# Patient Record
Sex: FEMALE | Race: WHITE | NOT HISPANIC OR LATINO | Employment: FULL TIME | URBAN - METROPOLITAN AREA
[De-identification: names, ages, dates, MRNs, and addresses within clinical notes are randomized per-mention and may not be internally consistent; named-entity substitution may affect disease eponyms.]

---

## 2023-06-30 ENCOUNTER — LAB (OUTPATIENT)
Dept: LAB | Facility: LAB | Age: 38
End: 2023-06-30
Payer: COMMERCIAL

## 2023-06-30 ENCOUNTER — OFFICE VISIT (OUTPATIENT)
Dept: PRIMARY CARE | Facility: CLINIC | Age: 38
End: 2023-06-30
Payer: COMMERCIAL

## 2023-06-30 VITALS
HEIGHT: 66 IN | OXYGEN SATURATION: 98 % | TEMPERATURE: 98.6 F | RESPIRATION RATE: 12 BRPM | BODY MASS INDEX: 35.38 KG/M2 | SYSTOLIC BLOOD PRESSURE: 146 MMHG | HEART RATE: 87 BPM | WEIGHT: 220.13 LBS | DIASTOLIC BLOOD PRESSURE: 78 MMHG

## 2023-06-30 DIAGNOSIS — F32.A ANXIETY AND DEPRESSION: ICD-10-CM

## 2023-06-30 DIAGNOSIS — F41.9 ANXIETY AND DEPRESSION: ICD-10-CM

## 2023-06-30 DIAGNOSIS — E55.9 VITAMIN D DEFICIENCY: ICD-10-CM

## 2023-06-30 DIAGNOSIS — Z76.89 ESTABLISHING CARE WITH NEW DOCTOR, ENCOUNTER FOR: ICD-10-CM

## 2023-06-30 DIAGNOSIS — Z00.00 WELLNESS EXAMINATION: Primary | ICD-10-CM

## 2023-06-30 DIAGNOSIS — Z00.00 WELLNESS EXAMINATION: ICD-10-CM

## 2023-06-30 PROBLEM — N80.03 ADENOMYOSIS: Status: ACTIVE | Noted: 2023-04-01

## 2023-06-30 PROBLEM — E78.5 HYPERLIPIDEMIA: Status: ACTIVE | Noted: 2023-06-30

## 2023-06-30 PROBLEM — R22.2 CHEST WALL MASS: Status: ACTIVE | Noted: 2023-06-30

## 2023-06-30 PROBLEM — F98.8 ADULT ATTENTION DEFICIT DISORDER: Status: ACTIVE | Noted: 2022-06-01

## 2023-06-30 PROBLEM — R51.9 CHRONIC DAILY HEADACHE: Status: ACTIVE | Noted: 2023-06-30

## 2023-06-30 LAB
ALANINE AMINOTRANSFERASE (SGPT) (U/L) IN SER/PLAS: 9 U/L (ref 7–45)
ALBUMIN (G/DL) IN SER/PLAS: 3.8 G/DL (ref 3.4–5)
ALKALINE PHOSPHATASE (U/L) IN SER/PLAS: 70 U/L (ref 33–110)
ANION GAP IN SER/PLAS: 15 MMOL/L (ref 10–20)
ASPARTATE AMINOTRANSFERASE (SGOT) (U/L) IN SER/PLAS: 12 U/L (ref 9–39)
BILIRUBIN TOTAL (MG/DL) IN SER/PLAS: 0.4 MG/DL (ref 0–1.2)
CALCIDIOL (25 OH VITAMIN D3) (NG/ML) IN SER/PLAS: 25 NG/ML
CALCIUM (MG/DL) IN SER/PLAS: 8.9 MG/DL (ref 8.6–10.3)
CARBON DIOXIDE, TOTAL (MMOL/L) IN SER/PLAS: 27 MMOL/L (ref 21–32)
CHLORIDE (MMOL/L) IN SER/PLAS: 100 MMOL/L (ref 98–107)
CHOLESTEROL (MG/DL) IN SER/PLAS: 165 MG/DL (ref 0–199)
CHOLESTEROL IN HDL (MG/DL) IN SER/PLAS: 49 MG/DL
CHOLESTEROL/HDL RATIO: 3.4
CREATININE (MG/DL) IN SER/PLAS: 0.9 MG/DL (ref 0.5–1.05)
ERYTHROCYTE DISTRIBUTION WIDTH (RATIO) BY AUTOMATED COUNT: 13.8 % (ref 11.5–14.5)
ERYTHROCYTE MEAN CORPUSCULAR HEMOGLOBIN CONCENTRATION (G/DL) BY AUTOMATED: 31.4 G/DL (ref 32–36)
ERYTHROCYTE MEAN CORPUSCULAR VOLUME (FL) BY AUTOMATED COUNT: 88 FL (ref 80–100)
ERYTHROCYTES (10*6/UL) IN BLOOD BY AUTOMATED COUNT: 4.61 X10E12/L (ref 4–5.2)
GFR FEMALE: 84 ML/MIN/1.73M2
GLUCOSE (MG/DL) IN SER/PLAS: 87 MG/DL (ref 74–99)
HEMATOCRIT (%) IN BLOOD BY AUTOMATED COUNT: 40.7 % (ref 36–46)
HEMOGLOBIN (G/DL) IN BLOOD: 12.8 G/DL (ref 12–16)
LDL: 81 MG/DL (ref 0–99)
LEUKOCYTES (10*3/UL) IN BLOOD BY AUTOMATED COUNT: 8.2 X10E9/L (ref 4.4–11.3)
PLATELETS (10*3/UL) IN BLOOD AUTOMATED COUNT: 428 X10E9/L (ref 150–450)
POTASSIUM (MMOL/L) IN SER/PLAS: 4.7 MMOL/L (ref 3.5–5.3)
PROTEIN TOTAL: 6.9 G/DL (ref 6.4–8.2)
SODIUM (MMOL/L) IN SER/PLAS: 137 MMOL/L (ref 136–145)
THYROTROPIN (MIU/L) IN SER/PLAS BY DETECTION LIMIT <= 0.05 MIU/L: 2.35 MIU/L (ref 0.44–3.98)
TRIGLYCERIDE (MG/DL) IN SER/PLAS: 177 MG/DL (ref 0–149)
UREA NITROGEN (MG/DL) IN SER/PLAS: 11 MG/DL (ref 6–23)
VLDL: 35 MG/DL (ref 0–40)

## 2023-06-30 PROCEDURE — 84443 ASSAY THYROID STIM HORMONE: CPT

## 2023-06-30 PROCEDURE — 82306 VITAMIN D 25 HYDROXY: CPT

## 2023-06-30 PROCEDURE — 1036F TOBACCO NON-USER: CPT

## 2023-06-30 PROCEDURE — 99385 PREV VISIT NEW AGE 18-39: CPT

## 2023-06-30 PROCEDURE — 85027 COMPLETE CBC AUTOMATED: CPT

## 2023-06-30 PROCEDURE — 80053 COMPREHEN METABOLIC PANEL: CPT

## 2023-06-30 PROCEDURE — 80061 LIPID PANEL: CPT

## 2023-06-30 PROCEDURE — 36415 COLL VENOUS BLD VENIPUNCTURE: CPT

## 2023-06-30 RX ORDER — DULOXETIN HYDROCHLORIDE 60 MG/1
60 CAPSULE, DELAYED RELEASE ORAL DAILY
Qty: 90 CAPSULE | Refills: 3 | Status: SHIPPED | OUTPATIENT
Start: 2023-06-30 | End: 2024-06-29

## 2023-06-30 RX ORDER — DULOXETIN HYDROCHLORIDE 60 MG/1
1 CAPSULE, DELAYED RELEASE ORAL DAILY
COMMUNITY
Start: 2022-04-07 | End: 2023-06-30 | Stop reason: SDUPTHER

## 2023-06-30 RX ORDER — NORGESTIMATE AND ETHINYL ESTRADIOL 0.25-0.035
KIT ORAL
COMMUNITY
Start: 2023-04-17

## 2023-06-30 RX ORDER — DEXTROAMPHETAMINE SACCHARATE, AMPHETAMINE ASPARTATE, DEXTROAMPHETAMINE SULFATE AND AMPHETAMINE SULFATE 5; 5; 5; 5 MG/1; MG/1; MG/1; MG/1
1 TABLET ORAL 2 TIMES DAILY
COMMUNITY
Start: 2023-06-14

## 2023-06-30 ASSESSMENT — ENCOUNTER SYMPTOMS
NAUSEA: 0
DYSURIA: 0
SINUS PRESSURE: 0
COMPULSIONS: 0
CONFUSION: 0
WEAKNESS: 0
DEPRESSED MOOD: 0
SLEEP QUALITY: FAIR
HOPELESSNESS: 0
PSYCHOMOTOR AGITATION: 0
CONSTIPATION: 0
PSYCHOMOTOR RETARDATION: 0
RHINORRHEA: 0
TROUBLE SWALLOWING: 0
CHOKING: 0
ABDOMINAL PAIN: 0
MALAISE: 0
SINUS PAIN: 0
NIGHTTIME AWAKENINGS: OCCASIONAL
PANIC: 0
INSOMNIA: 0
BACK PAIN: 0
HYPERSOMNIA: 0
FEVER: 0
SORE THROAT: 0
FEELINGS OF WORTHLESSNESS: 0
WHEEZING: 0
HEMATURIA: 0
PALPITATIONS: 0
COUGH: 0
ARTHRALGIAS: 0
DECREASED CONCENTRATION: 0
HYPERVENTILATION: 0
SEIZURES: 0
FATIGUE: 0
WOUND: 0
HOURS OF SLEEP PER NIGHT: 7 HOURS
ANHEDONIA: 0
JOINT SWELLING: 0
IRRITABILITY: 0
WEIGHT GAIN: 0
FACIAL ASYMMETRY: 0
MEMORY IMPAIRMENT: 0
THOUGHT CONTENT - OBSESSIONS: 0
THOUGHTS THAT DEATH WOULD BE EASIER: 0
SHORTNESS OF BREATH: 0
WEIGHT LOSS: 0
DEPRESSION: 1
NERVOUS/ANXIOUS: 0
RESTLESSNESS: 0
SLEEP DISTURBANCE: 0
HEADACHES: 1
MUSCLE TENSION: 0
UNEXPECTED WEIGHT CHANGE: 0
LIGHT-HEADEDNESS: 0

## 2023-06-30 ASSESSMENT — PATIENT HEALTH QUESTIONNAIRE - PHQ9
1. LITTLE INTEREST OR PLEASURE IN DOING THINGS: NOT AT ALL
SUM OF ALL RESPONSES TO PHQ9 QUESTIONS 1 AND 2: 0
2. FEELING DOWN, DEPRESSED OR HOPELESS: NOT AT ALL

## 2023-06-30 ASSESSMENT — COLUMBIA-SUICIDE SEVERITY RATING SCALE - C-SSRS
6. HAVE YOU EVER DONE ANYTHING, STARTED TO DO ANYTHING, OR PREPARED TO DO ANYTHING TO END YOUR LIFE?: NO
2. HAVE YOU ACTUALLY HAD ANY THOUGHTS OF KILLING YOURSELF?: NO
1. IN THE PAST MONTH, HAVE YOU WISHED YOU WERE DEAD OR WISHED YOU COULD GO TO SLEEP AND NOT WAKE UP?: NO

## 2023-06-30 NOTE — PROGRESS NOTES
Subjective   Patient ID: Verito Cross is a 38 y.o. female who presents for No chief complaint on file..    Pt is here for annual wellness.  Reports overall health is good    Do you take any herbs or supplements that were not prescribed by a doctor? no  Are you taking calcium supplements? no  Are you taking aspirin daily? no  Colonoscopy: n/a  Fasting blood work: 6/30/23  Last eye exam: soon  Last dental Exam: working  Exercise:not really, walks her dog has a mutt  Mood:pleasant  Sleep: good,  snores  Diet:  could be better  Occupation:  works at [employer]  Do you have pain that bothers you in your daily life? no    1. Patient Counseling:  --Nutrition: Stressed importance of moderation in sodium/caffeine intake, saturated fat and cholesterol, caloric balance, sufficient intake of fresh fruits, vegetables, fiber, calcium, iron, and 1 mg of folate supplement per day (for females capable of pregnancy).  --Discussed the issue of estrogen replacement, calcium supplement, and the daily use of baby aspirin.  --Exercise: Stressed the importance of regular exercise.   --Substance Abuse: Discussed cessation/primary prevention of tobacco, alcohol, or other drug use; driving or other dangerous activities under the influence; availability of treatment for abuse.    --Sexuality: Discussed sexually transmitted diseases, partner selection, use of condoms, avoidance of unintended pregnancy  and contraceptive alternatives.   --Injury prevention: Discussed safety belts, safety helmets, smoke detector, smoking near bedding or upholstery.   --Dental health: Discussed importance of regular tooth brushing, flossing, and dental visits.  --Immunizations reviewed.  --Discussed benefits of screening colonoscopy.  --After hours service discussed with patient  2 Discussed the patient's BMI with her.  The BMI is above average. The patient received Provided instructions on dietary changes  Provided instructions on exercise  because they have an above normal BMI.  3 Follow up in one year  Follows with ob for pap       Depression  Visit Type: follow-up  Patient is not experiencing: anhedonia, chest pain, choking sensation, compulsions, confusion, decreased concentration, depressed mood, dizziness, dry mouth, excessive worry, fatigue, feelings of hopelessness, feelings of worthlessness, hypersomnia, hyperventilation, impotence, insomnia, irritability, malaise, memory impairment, muscle tension, nausea, nervousness/anxiety, obsessions, palpitations, panic, psychomotor agitation, psychomotor retardation, restlessness, shortness of breath, suicidal ideas, suicidal planning, thoughts of death, weight gain and weight loss.   Severity: mild (well controlled with med)   Sleep per night: 7 hours  Sleep quality: fair  Nighttime awakenings: occasional  Compliance with medications:  %         Review of Systems   Constitutional:  Negative for fatigue, fever, irritability, unexpected weight change, weight gain and weight loss.   HENT:  Negative for congestion, ear discharge, ear pain, nosebleeds, postnasal drip, rhinorrhea, sinus pressure, sinus pain, sneezing, sore throat and trouble swallowing.    Respiratory:  Negative for cough, choking, shortness of breath and wheezing.    Cardiovascular:  Negative for chest pain, palpitations and leg swelling.   Gastrointestinal:  Negative for abdominal pain, constipation and nausea.   Genitourinary:  Positive for vaginal bleeding. Negative for dysuria, hematuria, impotence, menstrual problem, pelvic pain and vaginal pain.        Recent diagnosis of adenadomosis     Musculoskeletal:  Negative for arthralgias, back pain, gait problem and joint swelling.   Skin:  Negative for rash and wound.   Neurological:  Positive for headaches. Negative for seizures, facial asymmetry, weakness and light-headedness.        Occasional migraine, since she was little, has visual aura before   Psychiatric/Behavioral:   "Positive for depression. Negative for confusion, decreased concentration, sleep disturbance and suicidal ideas. The patient is not nervous/anxious and does not have insomnia.        Objective   /78   Pulse 87   Temp 37 °C (98.6 °F)   Resp 12   Ht 1.676 m (5' 6\")   Wt 99.8 kg (220 lb 2 oz)   SpO2 98%   BMI 35.53 kg/m²     Physical Exam  Constitutional:       Appearance: Normal appearance.   HENT:      Head: Normocephalic and atraumatic.      Right Ear: Tympanic membrane and external ear normal.      Left Ear: Tympanic membrane and external ear normal.      Nose: Nose normal.      Mouth/Throat:      Mouth: Mucous membranes are moist.      Pharynx: Oropharynx is clear. Uvula midline.   Eyes:      General: Lids are normal.      Extraocular Movements: Extraocular movements intact.      Pupils: Pupils are equal, round, and reactive to light.   Neck:      Thyroid: No thyromegaly or thyroid tenderness.   Cardiovascular:      Rate and Rhythm: Normal rate and regular rhythm.      Heart sounds: Normal heart sounds.   Pulmonary:      Effort: Pulmonary effort is normal.      Breath sounds: Normal breath sounds.   Abdominal:      General: Bowel sounds are normal.      Palpations: Abdomen is soft.      Tenderness: There is no abdominal tenderness. There is no guarding.   Musculoskeletal:         General: No swelling. Normal range of motion.      Cervical back: Normal range of motion.      Right lower leg: No edema.      Left lower leg: No edema.   Lymphadenopathy:      Head:      Right side of head: No submental, submandibular or tonsillar adenopathy.      Left side of head: No submental, submandibular or tonsillar adenopathy.      Cervical: No cervical adenopathy.   Skin:     General: Skin is warm and dry.      Capillary Refill: Capillary refill takes less than 2 seconds.      Coloration: Skin is not jaundiced.      Findings: No lesion or rash.   Neurological:      General: No focal deficit present.      Mental " Status: She is alert and oriented to person, place, and time.   Psychiatric:         Mood and Affect: Mood normal.         Behavior: Behavior normal.         Thought Content: Thought content normal.         Judgment: Judgment normal.         Assessment/Plan   Problem List Items Addressed This Visit    None  Visit Diagnoses       Wellness examination    -  Primary    Relevant Orders    TSH with reflex to Free T4 if abnormal    Lipid Panel    Comprehensive Metabolic Panel    CBC    Establishing care with new doctor, encounter for        Vitamin D deficiency        Relevant Orders    Vitamin D, Total

## 2023-11-30 DIAGNOSIS — M25.511 RIGHT SHOULDER PAIN, UNSPECIFIED CHRONICITY: Primary | ICD-10-CM

## 2023-12-01 ENCOUNTER — OFFICE VISIT (OUTPATIENT)
Dept: ORTHOPEDIC SURGERY | Facility: CLINIC | Age: 38
End: 2023-12-01
Payer: COMMERCIAL

## 2023-12-01 ENCOUNTER — HOSPITAL ENCOUNTER (OUTPATIENT)
Dept: RADIOLOGY | Facility: HOSPITAL | Age: 38
Discharge: HOME | End: 2023-12-01
Payer: COMMERCIAL

## 2023-12-01 VITALS — HEIGHT: 66 IN | BODY MASS INDEX: 35.36 KG/M2 | WEIGHT: 220 LBS

## 2023-12-01 DIAGNOSIS — M77.8 TENDINITIS OF RIGHT SHOULDER: Primary | ICD-10-CM

## 2023-12-01 DIAGNOSIS — M25.511 RIGHT SHOULDER PAIN, UNSPECIFIED CHRONICITY: ICD-10-CM

## 2023-12-01 PROCEDURE — 99203 OFFICE O/P NEW LOW 30 MIN: CPT

## 2023-12-01 PROCEDURE — 1036F TOBACCO NON-USER: CPT

## 2023-12-01 PROCEDURE — 73030 X-RAY EXAM OF SHOULDER: CPT | Mod: RIGHT SIDE | Performed by: RADIOLOGY

## 2023-12-01 PROCEDURE — 73030 X-RAY EXAM OF SHOULDER: CPT | Mod: RT,FY

## 2023-12-01 PROCEDURE — 20610 DRAIN/INJ JOINT/BURSA W/O US: CPT

## 2023-12-01 RX ORDER — TRIAMCINOLONE ACETONIDE 40 MG/ML
40 INJECTION, SUSPENSION INTRA-ARTICULAR; INTRAMUSCULAR
Status: COMPLETED | OUTPATIENT
Start: 2023-12-01 | End: 2023-12-01

## 2023-12-01 RX ORDER — LIDOCAINE HYDROCHLORIDE 5 MG/ML
4 INJECTION, SOLUTION INFILTRATION; PERINEURAL
Status: COMPLETED | OUTPATIENT
Start: 2023-12-01 | End: 2023-12-01

## 2023-12-01 RX ADMIN — TRIAMCINOLONE ACETONIDE 40 MG: 40 INJECTION, SUSPENSION INTRA-ARTICULAR; INTRAMUSCULAR at 15:48

## 2023-12-01 RX ADMIN — LIDOCAINE HYDROCHLORIDE 4 ML: 5 INJECTION, SOLUTION INFILTRATION; PERINEURAL at 15:48

## 2023-12-01 NOTE — PROGRESS NOTES
HPI  Verito Cross is a 38 y.o. female  in office today for   Chief Complaint   Patient presents with    Right Shoulder - Pain   .  she states she has had pain for about 6 months, worse in the last few weeks.  Worse with reaching overhead, laying on the shoulder.  She has tried ice, TENS unit, ibuprofen which do help with the pain. Denies any injury or trauma to the shoulder      Medication  Current Outpatient Medications on File Prior to Visit   Medication Sig Dispense Refill    amphetamine-dextroamphetamine (Adderall) 20 mg tablet Take 1 tablet (20 mg) by mouth 2 times a day.      DULoxetine (Cymbalta) 60 mg DR capsule Take 1 capsule (60 mg) by mouth once daily. 90 capsule 3    Estarylla 0.25-35 mg-mcg tablet Take by mouth.       No current facility-administered medications on file prior to visit.       Physical Exam  Constitutional: well developed, well nourished female in no acute distress  Psychiatric: normal mood, appropriate affect  Eyes: sclera anicteric  HENT: normocephalic/atraumatic  CV: regular rate and rhythm   Respiratory: non labored breathing  Integumentary: no rash  Neurological: moves all extremities    Right Shoulder Exam     Tenderness   The patient is experiencing tenderness in the acromioclavicular joint.    Range of Motion   The patient has normal right shoulder ROM.    Muscle Strength   Abduction: 5/5   External rotation: 5/5     Tests   Apprehension: negative  Pete test: negative  Cross arm: negative  Impingement: negative  Drop arm: negative        Patient ID: Verito Cross is a 38 y.o. female.    L Inj/Asp: R subacromial bursa on 12/1/2023 3:48 PM  Indications: pain  Details: 22 G needle, posterior approach  Medications: 40 mg triamcinolone acetonide 40 mg/mL; 4 mL lidocaine 5 mg/mL (0.5 %)  Outcome: tolerated well, no immediate complications  Procedure, treatment alternatives, risks and benefits explained, specific risks discussed. Consent was given by the patient.  Immediately prior to procedure a time out was called to verify the correct patient, procedure, equipment, support staff and site/side marked as required. Patient was prepped and draped in the usual sterile fashion.         Imaging/Lab:  X-rays were taken today which were reviewed by myself and read by myself and show no acute fracture or dislocation.  No degenerative changes.      Assessment  Assessment: Right shoulder pain    Plan  Plan:  History, physical exam, and imaging were reviewed with patient. Pain likely a tendonitis or bursitis, she has good strength and ROM. Discussed options for shoulder pain including RICE, antiinflammatories, injections, PT vs home exercises.  She would like to try an injection at this time.  The right shoulder was injected per procedure note above.  She plans to do some home therapy that her employer offers.   Follow Up: Patient to follow up as needed if pain persists or gets worse.      All questions were answered for the patient prior to end of exam and patient addressed their understanding.    May Krause PA-C  12/01/23

## 2024-06-28 ASSESSMENT — PROMIS GLOBAL HEALTH SCALE
CARRYOUT_PHYSICAL_ACTIVITIES: COMPLETELY
RATE_GENERAL_HEALTH: VERY GOOD
RATE_SOCIAL_SATISFACTION: VERY GOOD
RATE_PHYSICAL_HEALTH: GOOD
CARRYOUT_SOCIAL_ACTIVITIES: VERY GOOD
EMOTIONAL_PROBLEMS: SOMETIMES
RATE_QUALITY_OF_LIFE: VERY GOOD
RATE_AVERAGE_FATIGUE: MILD
RATE_MENTAL_HEALTH: VERY GOOD
RATE_AVERAGE_PAIN: 0

## 2024-07-05 ENCOUNTER — APPOINTMENT (OUTPATIENT)
Dept: PRIMARY CARE | Facility: CLINIC | Age: 39
End: 2024-07-05
Payer: COMMERCIAL

## 2024-07-05 ENCOUNTER — LAB (OUTPATIENT)
Dept: LAB | Facility: LAB | Age: 39
End: 2024-07-05
Payer: COMMERCIAL

## 2024-07-05 VITALS
WEIGHT: 212 LBS | HEIGHT: 66 IN | SYSTOLIC BLOOD PRESSURE: 100 MMHG | HEART RATE: 80 BPM | OXYGEN SATURATION: 98 % | DIASTOLIC BLOOD PRESSURE: 69 MMHG | BODY MASS INDEX: 34.07 KG/M2

## 2024-07-05 DIAGNOSIS — E55.9 VITAMIN D DEFICIENCY: Primary | ICD-10-CM

## 2024-07-05 DIAGNOSIS — Z00.00 WELLNESS EXAMINATION: ICD-10-CM

## 2024-07-05 DIAGNOSIS — E55.9 VITAMIN D DEFICIENCY: ICD-10-CM

## 2024-07-05 LAB
ALBUMIN SERPL BCP-MCNC: 4.2 G/DL (ref 3.4–5)
ALP SERPL-CCNC: 81 U/L (ref 33–110)
ALT SERPL W P-5'-P-CCNC: 16 U/L (ref 7–45)
ANION GAP SERPL CALC-SCNC: 12 MMOL/L (ref 10–20)
AST SERPL W P-5'-P-CCNC: 17 U/L (ref 9–39)
BILIRUB SERPL-MCNC: 0.8 MG/DL (ref 0–1.2)
BUN SERPL-MCNC: 18 MG/DL (ref 6–23)
CALCIUM SERPL-MCNC: 9.3 MG/DL (ref 8.6–10.3)
CHLORIDE SERPL-SCNC: 102 MMOL/L (ref 98–107)
CHOLEST SERPL-MCNC: 194 MG/DL (ref 0–199)
CHOLESTEROL/HDL RATIO: 4
CO2 SERPL-SCNC: 28 MMOL/L (ref 21–32)
CREAT SERPL-MCNC: 1.05 MG/DL (ref 0.5–1.05)
EGFRCR SERPLBLD CKD-EPI 2021: 69 ML/MIN/1.73M*2
ERYTHROCYTE [DISTWIDTH] IN BLOOD BY AUTOMATED COUNT: 13.7 % (ref 11.5–14.5)
GLUCOSE SERPL-MCNC: 89 MG/DL (ref 74–99)
HCT VFR BLD AUTO: 41.6 % (ref 36–46)
HDLC SERPL-MCNC: 48.1 MG/DL
HGB BLD-MCNC: 13.1 G/DL (ref 12–16)
LDLC SERPL CALC-MCNC: 129 MG/DL
MCH RBC QN AUTO: 28.2 PG (ref 26–34)
MCHC RBC AUTO-ENTMCNC: 31.5 G/DL (ref 32–36)
MCV RBC AUTO: 90 FL (ref 80–100)
NON HDL CHOLESTEROL: 146 MG/DL (ref 0–149)
NRBC BLD-RTO: 0 /100 WBCS (ref 0–0)
PLATELET # BLD AUTO: 395 X10*3/UL (ref 150–450)
POTASSIUM SERPL-SCNC: 4.4 MMOL/L (ref 3.5–5.3)
PROT SERPL-MCNC: 7 G/DL (ref 6.4–8.2)
RBC # BLD AUTO: 4.64 X10*6/UL (ref 4–5.2)
SODIUM SERPL-SCNC: 138 MMOL/L (ref 136–145)
TRIGL SERPL-MCNC: 85 MG/DL (ref 0–149)
TSH SERPL-ACNC: 1.61 MIU/L (ref 0.44–3.98)
VLDL: 17 MG/DL (ref 0–40)
WBC # BLD AUTO: 6.2 X10*3/UL (ref 4.4–11.3)

## 2024-07-05 PROCEDURE — 99395 PREV VISIT EST AGE 18-39: CPT

## 2024-07-05 PROCEDURE — 85027 COMPLETE CBC AUTOMATED: CPT

## 2024-07-05 PROCEDURE — 1036F TOBACCO NON-USER: CPT

## 2024-07-05 PROCEDURE — 80053 COMPREHEN METABOLIC PANEL: CPT

## 2024-07-05 PROCEDURE — 82306 VITAMIN D 25 HYDROXY: CPT

## 2024-07-05 PROCEDURE — 84443 ASSAY THYROID STIM HORMONE: CPT

## 2024-07-05 PROCEDURE — 36415 COLL VENOUS BLD VENIPUNCTURE: CPT

## 2024-07-05 PROCEDURE — 80061 LIPID PANEL: CPT

## 2024-07-05 ASSESSMENT — ENCOUNTER SYMPTOMS
SHORTNESS OF BREATH: 0
ARTHRALGIAS: 0
PALPITATIONS: 0
FEVER: 0
ABDOMINAL PAIN: 0
PSYCHIATRIC NEGATIVE: 1
HEADACHES: 1
SINUS PAIN: 0
SINUS PRESSURE: 0
JOINT SWELLING: 0
COUGH: 0
WOUND: 0
TROUBLE SWALLOWING: 0
NAUSEA: 0
FACIAL ASYMMETRY: 0
CONSTIPATION: 0
DYSURIA: 0
RHINORRHEA: 0
LIGHT-HEADEDNESS: 0
UNEXPECTED WEIGHT CHANGE: 0
WHEEZING: 0
SORE THROAT: 0
BACK PAIN: 0
FATIGUE: 0
SEIZURES: 0
WEAKNESS: 0
HEMATURIA: 0

## 2024-07-05 NOTE — PROGRESS NOTES
Subjective   Patient ID: Verito Cross is a 39 y.o. female who presents for Annual Exam (Cpe and annual labs).    Pt is here for annual wellness.  Reports overall health is good     Do you take any herbs or supplements that were not prescribed by a doctor? Vitamin d 5000daily  Are you taking calcium supplements? no  Are you taking aspirin daily? no  Colonoscopy: n/a  Fasting blood work: 7/2024  Last eye exam: dec 2023  Last dental Exam: aug 24  Exercise:not really, walks her dog has a mutt  Mood:pleasant  Sleep: good,  snores-now has cpap  Diet:  could be better working on portion control  Occupation:  works at [employer]  Do you have pain that bothers you in your daily life? no     1. Patient Counseling:  --Nutrition: Stressed importance of moderation in sodium/caffeine intake, saturated fat and cholesterol, caloric balance, sufficient intake of fresh fruits, vegetables, fiber, calcium, iron, and 1 mg of folate supplement per day (for females capable of pregnancy).  --Discussed the issue of estrogen replacement, calcium supplement, and the daily use of baby aspirin.  --Exercise: Stressed the importance of regular exercise.   --Substance Abuse: Discussed cessation/primary prevention of tobacco, alcohol, or other drug use; driving or other dangerous activities under the influence; availability of treatment for abuse.    --Sexuality: Discussed sexually transmitted diseases, partner selection, use of condoms, avoidance of unintended pregnancy  and contraceptive alternatives.   --Injury prevention: Discussed safety belts, safety helmets, smoke detector, smoking near bedding or upholstery.   --Dental health: Discussed importance of regular tooth brushing, flossing, and dental visits.  --Immunizations reviewed.  --Discussed benefits of screening colonoscopy.  --After hours service discussed with patient  2 Discussed the patient's BMI with her.  The BMI is above average. The patient received Provided  "instructions on dietary changes  Provided instructions on exercise because they have an above normal BMI.  3 Follow up in one year  Follows with ob for pap         Review of Systems   Constitutional:  Negative for fatigue, fever and unexpected weight change.   HENT:  Negative for congestion, ear discharge, ear pain, nosebleeds, postnasal drip, rhinorrhea, sinus pressure, sinus pain, sneezing, sore throat and trouble swallowing.    Respiratory:  Negative for cough, shortness of breath and wheezing.    Cardiovascular:  Negative for chest pain, palpitations and leg swelling.   Gastrointestinal:  Negative for abdominal pain, constipation and nausea.   Genitourinary:  Negative for dysuria, hematuria, menstrual problem, pelvic pain, vaginal bleeding and vaginal pain.        Follow with  [facility] wom[facility]     Musculoskeletal:  Negative for arthralgias, back pain, gait problem and joint swelling.   Skin:  Negative for rash and wound.   Neurological:  Positive for headaches. Negative for seizures, facial asymmetry, weakness and light-headedness.   Psychiatric/Behavioral: Negative.         Objective   /69   Pulse 80   Ht 1.676 m (5' 6\")   Wt 96.2 kg (212 lb)   SpO2 98%   BMI 34.22 kg/m²     Physical Exam  Vitals and nursing note reviewed.   Constitutional:       Appearance: Normal appearance.   HENT:      Head: Normocephalic and atraumatic.      Right Ear: Tympanic membrane and external ear normal.      Left Ear: Tympanic membrane and external ear normal.      Nose: Nose normal.      Mouth/Throat:      Mouth: Mucous membranes are moist.      Pharynx: Oropharynx is clear. Uvula midline.   Eyes:      General: Lids are normal.      Extraocular Movements: Extraocular movements intact.      Pupils: Pupils are equal, round, and reactive to light.   Neck:      Thyroid: No thyromegaly or thyroid tenderness.   Cardiovascular:      Rate and Rhythm: Normal rate and regular rhythm.      Heart sounds: Normal heart " sounds.   Pulmonary:      Effort: Pulmonary effort is normal.      Breath sounds: Normal breath sounds.   Abdominal:      General: Bowel sounds are normal.      Palpations: Abdomen is soft.      Tenderness: There is no abdominal tenderness. There is no guarding.   Musculoskeletal:         General: No swelling. Normal range of motion.      Cervical back: Normal range of motion.      Right lower leg: No edema.      Left lower leg: No edema.   Lymphadenopathy:      Head:      Right side of head: No submental, submandibular or tonsillar adenopathy.      Left side of head: No submental, submandibular or tonsillar adenopathy.      Cervical: No cervical adenopathy.   Skin:     General: Skin is warm and dry.      Capillary Refill: Capillary refill takes less than 2 seconds.      Coloration: Skin is not jaundiced.      Findings: No lesion or rash.   Neurological:      General: No focal deficit present.      Mental Status: She is alert and oriented to person, place, and time.   Psychiatric:         Mood and Affect: Mood normal.         Behavior: Behavior normal.         Thought Content: Thought content normal.         Judgment: Judgment normal.         Assessment/Plan   Verito was seen today for annual exam.  Diagnoses and all orders for this visit:  Vitamin D deficiency  -     Comprehensive Metabolic Panel; Future  -     CBC; Future  -     Lipid Panel; Future  -     TSH with reflex to Free T4 if abnormal; Future  Wellness examination  -     Vitamin D 25-Hydroxy,Total (for eval of Vitamin D levels); Future    RTC 1 yr for annual wellness

## 2024-07-06 LAB — 25(OH)D3 SERPL-MCNC: 65 NG/ML (ref 30–100)

## 2024-07-31 ENCOUNTER — APPOINTMENT (OUTPATIENT)
Dept: OBSTETRICS AND GYNECOLOGY | Facility: CLINIC | Age: 39
End: 2024-07-31
Payer: COMMERCIAL

## 2024-08-29 ENCOUNTER — APPOINTMENT (OUTPATIENT)
Dept: OBSTETRICS AND GYNECOLOGY | Facility: CLINIC | Age: 39
End: 2024-08-29
Payer: COMMERCIAL

## 2024-09-10 ENCOUNTER — APPOINTMENT (OUTPATIENT)
Dept: PRIMARY CARE | Facility: CLINIC | Age: 39
End: 2024-09-10
Payer: COMMERCIAL

## 2024-09-19 ENCOUNTER — APPOINTMENT (OUTPATIENT)
Dept: OBSTETRICS AND GYNECOLOGY | Facility: CLINIC | Age: 39
End: 2024-09-19
Payer: COMMERCIAL

## 2024-09-19 VITALS
WEIGHT: 210 LBS | DIASTOLIC BLOOD PRESSURE: 70 MMHG | HEIGHT: 66 IN | BODY MASS INDEX: 33.75 KG/M2 | SYSTOLIC BLOOD PRESSURE: 116 MMHG

## 2024-09-19 DIAGNOSIS — Z01.419 WELL WOMAN EXAM WITH ROUTINE GYNECOLOGICAL EXAM: Primary | ICD-10-CM

## 2024-09-19 DIAGNOSIS — Z12.31 ENCOUNTER FOR SCREENING MAMMOGRAM FOR BREAST CANCER: ICD-10-CM

## 2024-09-19 PROCEDURE — 99395 PREV VISIT EST AGE 18-39: CPT | Performed by: NURSE PRACTITIONER

## 2024-09-19 PROCEDURE — 88175 CYTOPATH C/V AUTO FLUID REDO: CPT

## 2024-09-19 PROCEDURE — 3008F BODY MASS INDEX DOCD: CPT | Performed by: NURSE PRACTITIONER

## 2024-09-19 PROCEDURE — 87624 HPV HI-RISK TYP POOLED RSLT: CPT

## 2024-09-19 PROCEDURE — 1036F TOBACCO NON-USER: CPT | Performed by: NURSE PRACTITIONER

## 2024-09-19 RX ORDER — SAME BUTANEDISULFONATE/BETAINE 400-600 MG
POWDER IN PACKET (EA) ORAL
COMMUNITY

## 2024-09-19 SDOH — ECONOMIC STABILITY: FOOD INSECURITY: WITHIN THE PAST 12 MONTHS, THE FOOD YOU BOUGHT JUST DIDN'T LAST AND YOU DIDN'T HAVE MONEY TO GET MORE.: NEVER TRUE

## 2024-09-19 SDOH — ECONOMIC STABILITY: TRANSPORTATION INSECURITY
IN THE PAST 12 MONTHS, HAS THE LACK OF TRANSPORTATION KEPT YOU FROM MEDICAL APPOINTMENTS OR FROM GETTING MEDICATIONS?: NO

## 2024-09-19 SDOH — ECONOMIC STABILITY: FOOD INSECURITY: WITHIN THE PAST 12 MONTHS, YOU WORRIED THAT YOUR FOOD WOULD RUN OUT BEFORE YOU GOT MONEY TO BUY MORE.: NEVER TRUE

## 2024-09-19 SDOH — ECONOMIC STABILITY: TRANSPORTATION INSECURITY
IN THE PAST 12 MONTHS, HAS LACK OF TRANSPORTATION KEPT YOU FROM MEETINGS, WORK, OR FROM GETTING THINGS NEEDED FOR DAILY LIVING?: NO

## 2024-09-19 ASSESSMENT — ENCOUNTER SYMPTOMS
OCCASIONAL FEELINGS OF UNSTEADINESS: 0
LOSS OF SENSATION IN FEET: 0
DEPRESSION: 0
ABDOMINAL PAIN: 1

## 2024-09-19 ASSESSMENT — PATIENT HEALTH QUESTIONNAIRE - PHQ9
1. LITTLE INTEREST OR PLEASURE IN DOING THINGS: NOT AT ALL
SUM OF ALL RESPONSES TO PHQ9 QUESTIONS 1 & 2: 0
2. FEELING DOWN, DEPRESSED OR HOPELESS: NOT AT ALL

## 2024-09-19 ASSESSMENT — PAIN SCALES - GENERAL: PAINLEVEL: 0-NO PAIN

## 2024-09-19 NOTE — PROGRESS NOTES
"Aarti Hopkins, APRN-CNP     Subjective   Verito Cross is a 39 y.o. female who presents for annual exam.   Patient is here as a 39-year-old G2, P2 for an annual checkup.    Patient would also like to discuss maintenance of her previously diagnosed adenomyosis from an ultrasound.  She continues to have lower abdominal discomfort and we previously discussed placement of a Mirena IUD as possible treatment.  She has had a Mirena in the past so she has knowledge of this device.  Past Medical History:   Diagnosis Date    ADHD (attention deficit hyperactivity disorder) 22    Anxiety 05    Arthritis 2018    Depression 05    Other specified health status     No pertinent past medical history     Past Surgical History:   Procedure Laterality Date     SECTION, CLASSIC  2017     Section     SECTION, LOW TRANSVERSE  08, 13    CHOLECYSTECTOMY  2017    Cholecystectomy       OB History          2    Para   2    Term   2            AB        Living   2         SAB        IAB        Ectopic        Multiple        Live Births   2               Patient's last menstrual period was 2024 (approximate).      Review of Systems   Gastrointestinal:  Positive for abdominal pain.   Genitourinary:  Positive for menstrual problem and pelvic pain.   All other systems reviewed and are negative.    Breast: No Complaints   Vaginal: No Complaints        Objective   /70   Ht 1.676 m (5' 6\")   Wt 95.3 kg (210 lb)   LMP 2024 (Approximate)   BMI 33.89 kg/m²   Physical Exam  Constitutional:       Appearance: She is obese.   Genitourinary:      Vulva and urethral meatus normal.      Right Labia: No rash.     Left Labia: No rash.     No vaginal discharge.      No vaginal prolapse present.     No vaginal atrophy present.       Right Adnexa: not tender and no mass present.     Left Adnexa: not tender and no mass present.     No cervical motion " tenderness.      Uterus is not enlarged.   Breasts:     Right: Normal.      Left: Normal.   HENT:      Head: Normocephalic.   Cardiovascular:      Rate and Rhythm: Normal rate.      Pulses: Normal pulses.   Pulmonary:      Effort: Pulmonary effort is normal.   Abdominal:      Palpations: Abdomen is soft.   Musculoskeletal:      Cervical back: Normal range of motion.   Neurological:      Mental Status: She is alert.   Skin:     General: Skin is warm and dry.   Psychiatric:         Mood and Affect: Mood normal.         Behavior: Behavior normal.                   Assessment/Plan   Problem List Items Addressed This Visit    None  Visit Diagnoses         Codes    Well woman exam with routine gynecological exam    -  Primary Z01.419    Relevant Orders    THINPREP PAP TEST    BI mammo bilateral screening tomosynthesis    Encounter for screening mammogram for breast cancer     Z12.31

## 2024-09-23 ENCOUNTER — APPOINTMENT (OUTPATIENT)
Dept: RADIOLOGY | Facility: HOSPITAL | Age: 39
End: 2024-09-23
Payer: COMMERCIAL

## 2024-09-23 ENCOUNTER — OFFICE VISIT (OUTPATIENT)
Dept: OBSTETRICS AND GYNECOLOGY | Facility: CLINIC | Age: 39
End: 2024-09-23
Payer: COMMERCIAL

## 2024-09-23 VITALS
DIASTOLIC BLOOD PRESSURE: 79 MMHG | SYSTOLIC BLOOD PRESSURE: 118 MMHG | WEIGHT: 209 LBS | BODY MASS INDEX: 33.59 KG/M2 | HEIGHT: 66 IN

## 2024-09-23 DIAGNOSIS — Z30.430 ENCOUNTER FOR IUD INSERTION: ICD-10-CM

## 2024-09-23 DIAGNOSIS — Z30.41 ENCOUNTER FOR SURVEILLANCE OF CONTRACEPTIVE PILLS: Primary | ICD-10-CM

## 2024-09-23 RX ORDER — NORGESTIMATE AND ETHINYL ESTRADIOL 0.25-0.035
1 KIT ORAL DAILY
Qty: 90 TABLET | Refills: 3 | Status: SHIPPED | OUTPATIENT
Start: 2024-09-23 | End: 2025-09-23

## 2024-09-23 ASSESSMENT — ENCOUNTER SYMPTOMS
OCCASIONAL FEELINGS OF UNSTEADINESS: 0
DEPRESSION: 0
LOSS OF SENSATION IN FEET: 0

## 2024-09-23 ASSESSMENT — PAIN SCALES - GENERAL: PAINLEVEL: 0-NO PAIN

## 2024-09-23 NOTE — PROGRESS NOTES
Patient ID: Verito Cross is a 39 y.o. female.    IUD Insertion    Performed by: EMMA Hollis  Authorized by: EMMA Hollis    Procedure: IUD insertion    Pregnancy risk: reasonably certain the patient is not pregnant    Date/Time of Insertion:  2024 1:45 PM  Immediately prior to procedure a time out was called: yes    Pelvic exam performed: yes    Speculum placed in vagina: yes    Cervix cleaned and prepped: yes    Tenaculum/Allis/Ring Forceps applied to cervix: yes    Anesthesia used: no    IUD inserted with no complications: no    Insertion comments: Unable to sound the uterus.  Tenaculum reapplied and unable to pass uterine sound through cervical os.  Patient has had 2  sections although with her first she did dilate to 10 cm and push prior to decision for  section.  Procedure was abandoned and she will return to birth control pills.      Patient will decide if she would like to come back with MD partner to discuss if there are any options to help with discomfort from adenomyosis.

## 2024-09-25 ENCOUNTER — HOSPITAL ENCOUNTER (OUTPATIENT)
Dept: RADIOLOGY | Facility: HOSPITAL | Age: 39
Discharge: HOME | End: 2024-09-25
Payer: COMMERCIAL

## 2024-09-25 VITALS — WEIGHT: 210 LBS | HEIGHT: 66 IN | BODY MASS INDEX: 33.75 KG/M2

## 2024-09-25 DIAGNOSIS — Z01.419 WELL WOMAN EXAM WITH ROUTINE GYNECOLOGICAL EXAM: ICD-10-CM

## 2024-09-25 PROCEDURE — 77063 BREAST TOMOSYNTHESIS BI: CPT | Performed by: RADIOLOGY

## 2024-09-25 PROCEDURE — 77067 SCR MAMMO BI INCL CAD: CPT | Performed by: RADIOLOGY

## 2024-09-25 PROCEDURE — 77067 SCR MAMMO BI INCL CAD: CPT

## 2024-09-30 ENCOUNTER — ANCILLARY ORDERS (OUTPATIENT)
Dept: RADIOLOGY | Facility: CLINIC | Age: 39
End: 2024-09-30
Payer: COMMERCIAL

## 2024-09-30 ENCOUNTER — TELEPHONE (OUTPATIENT)
Dept: OBSTETRICS AND GYNECOLOGY | Facility: CLINIC | Age: 39
End: 2024-09-30
Payer: COMMERCIAL

## 2024-09-30 DIAGNOSIS — R92.8 OTHER ABNORMAL AND INCONCLUSIVE FINDINGS ON DIAGNOSTIC IMAGING OF BREAST: Primary | ICD-10-CM

## 2024-09-30 NOTE — TELEPHONE ENCOUNTER
RN called Patient in response to [employer] message  Patient verified by name and   RN reviewed mammogram results with Patient and provided education  Ultrasound scheduled for 10/1/2024  Patient verbalizes understanding and has no further questions at this time  Yvonne Alvarez RN

## 2024-10-01 ENCOUNTER — HOSPITAL ENCOUNTER (OUTPATIENT)
Dept: RADIOLOGY | Facility: HOSPITAL | Age: 39
Discharge: HOME | End: 2024-10-01
Payer: COMMERCIAL

## 2024-10-01 DIAGNOSIS — R92.8 OTHER ABNORMAL AND INCONCLUSIVE FINDINGS ON DIAGNOSTIC IMAGING OF BREAST: ICD-10-CM

## 2024-10-01 PROCEDURE — 76642 ULTRASOUND BREAST LIMITED: CPT | Mod: RT

## 2024-10-01 PROCEDURE — 76642 ULTRASOUND BREAST LIMITED: CPT | Mod: RIGHT SIDE | Performed by: RADIOLOGY

## 2024-10-01 PROCEDURE — 76982 USE 1ST TARGET LESION: CPT

## 2024-10-21 ENCOUNTER — APPOINTMENT (OUTPATIENT)
Dept: OBSTETRICS AND GYNECOLOGY | Facility: CLINIC | Age: 39
End: 2024-10-21
Payer: COMMERCIAL

## 2024-10-21 ENCOUNTER — PREP FOR PROCEDURE (OUTPATIENT)
Dept: OBSTETRICS AND GYNECOLOGY | Facility: HOSPITAL | Age: 39
End: 2024-10-21

## 2024-10-21 VITALS — SYSTOLIC BLOOD PRESSURE: 117 MMHG | BODY MASS INDEX: 34.38 KG/M2 | WEIGHT: 213 LBS | DIASTOLIC BLOOD PRESSURE: 77 MMHG

## 2024-10-21 DIAGNOSIS — Z01.818 PREOPERATIVE EXAM FOR GYNECOLOGIC SURGERY: ICD-10-CM

## 2024-10-21 DIAGNOSIS — N92.0 MENORRHAGIA WITH REGULAR CYCLE: Primary | ICD-10-CM

## 2024-10-21 DIAGNOSIS — N80.03 ADENOMYOSIS: ICD-10-CM

## 2024-10-21 DIAGNOSIS — N94.6 DYSMENORRHEA: ICD-10-CM

## 2024-10-21 DIAGNOSIS — N92.0 MENORRHAGIA WITH REGULAR CYCLE: ICD-10-CM

## 2024-10-21 DIAGNOSIS — N80.03 ADENOMYOSIS: Primary | ICD-10-CM

## 2024-10-21 DIAGNOSIS — R10.2 PELVIC PAIN IN FEMALE: ICD-10-CM

## 2024-10-21 PROCEDURE — 1036F TOBACCO NON-USER: CPT | Performed by: OBSTETRICS & GYNECOLOGY

## 2024-10-21 PROCEDURE — 99214 OFFICE O/P EST MOD 30 MIN: CPT | Performed by: OBSTETRICS & GYNECOLOGY

## 2024-10-21 RX ORDER — CELECOXIB 200 MG/1
400 CAPSULE ORAL ONCE
OUTPATIENT
Start: 2024-10-21 | End: 2024-10-21

## 2024-10-21 ASSESSMENT — ENCOUNTER SYMPTOMS
HEMATOLOGIC/LYMPHATIC NEGATIVE: 0
ALLERGIC/IMMUNOLOGIC NEGATIVE: 0
PSYCHIATRIC NEGATIVE: 0
EYES NEGATIVE: 0
CARDIOVASCULAR NEGATIVE: 0
RESPIRATORY NEGATIVE: 0
ENDOCRINE NEGATIVE: 0
NEUROLOGICAL NEGATIVE: 0
GASTROINTESTINAL NEGATIVE: 0
CONSTITUTIONAL NEGATIVE: 0
MUSCULOSKELETAL NEGATIVE: 0

## 2024-10-21 ASSESSMENT — PATIENT HEALTH QUESTIONNAIRE - PHQ9
1. LITTLE INTEREST OR PLEASURE IN DOING THINGS: NOT AT ALL
2. FEELING DOWN, DEPRESSED OR HOPELESS: NOT AT ALL
SUM OF ALL RESPONSES TO PHQ9 QUESTIONS 1 AND 2: 0

## 2024-10-21 ASSESSMENT — PAIN SCALES - GENERAL: PAINLEVEL_OUTOF10: 0-NO PAIN

## 2024-10-21 NOTE — PROGRESS NOTES
Subjective   Patient ID: Verito Cross is a 39 y.o. female who presents for Consoltation (Last Pap 9/19/24 wnl. HPV Neg. Mammogram Sched 4/4/25 @7:30am).  HPI  Patient here for evaluation for possible hysterectomy.  Chart extensively reviewed.  Discussed with nurse practitioner as well.  Notes reviewed ultrasound from 1 year ago reviewed as well.  Patient with history of very heavy but regular menses both heavy and light.  Bleeds occasionally through pads.  Patient also notes severe pain during her cycles as well.  Diagnosis adenomyosis.  Attempted IUD placement was not successful.  Had history of a possible IUD placement 1 to 2 years ago which also was not successful.  Currently is on progestin oral contraceptives which has not helped.    Review of Systems  Dysmenorrhea, menorrhagia    Objective   Physical Exam  Vitals reviewed.   Constitutional:       Appearance: Normal appearance. She is obese.   Cardiovascular:      Rate and Rhythm: Normal rate and regular rhythm.   Pulmonary:      Effort: Pulmonary effort is normal.      Breath sounds: Normal breath sounds.   Abdominal:      General: Abdomen is flat. Bowel sounds are normal. There is no distension.      Palpations: Abdomen is soft.      Tenderness: There is no abdominal tenderness. There is no guarding.   Genitourinary:     Comments: External genitalia unremarkable  Vagina clear  Cervix closed very posterior uterus possibly adherent anteriorly but normal size  Adnexa without masses or tenderness  Perineum without lesions or inflammation  Musculoskeletal:      Cervical back: Normal range of motion and neck supple.   Neurological:      General: No focal deficit present.      Mental Status: She is alert.   Psychiatric:         Mood and Affect: Mood normal.         Behavior: Behavior normal.         Thought Content: Thought content normal.         Judgment: Judgment normal.     Assessment/Plan   Diagnoses and all orders for this visit:  Menorrhagia with  regular cycle  Adenomyosis  Dysmenorrhea  Preoperative exam for gynecologic surgery    Chart extensively reviewed.  Case reviewed with nurse practitioner has been taking care of the patient.  Labs and ultrasound noted  Patient with history of menorrhagia with regular cycles severe dysmenorrhea with diagnosis of adenomyosis.  Longstanding.  Patient has been tried on contraceptives in the past apparently is on progestin which is not effective.  Attempted IUD placement x 2 has not been effective either.  Options reviewed with patient and partner.  Extensively gone over.  Discussed hormonal therapy with Depo-Provera or subdermal implant to possibly stop her cycles completely.  May also try IUD to be placed under anesthesia.  Discussed operative management.  Ablation slightly contraindicated due to history of 2 prior  sections.  Increased risk of burn through.  Discussed hysterectomy probably laparoscopic or robotic, possibly would require open case depending on degree of adhesions.  Increased risk of damage to internal organs due to previous surgery noted as well.    At this point patient will consider her options and call back.  Continue oral contraceptives for now  30-minute total visit including consultation with nurse practitioner chart           Keaton Crouch MD 10/21/24 10:32 AM

## 2024-10-22 ENCOUNTER — TELEPHONE (OUTPATIENT)
Dept: OBSTETRICS AND GYNECOLOGY | Facility: CLINIC | Age: 39
End: 2024-10-22
Payer: COMMERCIAL

## 2024-10-22 DIAGNOSIS — Z01.818 PREOPERATIVE EXAM FOR GYNECOLOGIC SURGERY: Primary | ICD-10-CM

## 2024-10-22 PROBLEM — N92.0 MENORRHAGIA WITH REGULAR CYCLE: Status: ACTIVE | Noted: 2024-10-21

## 2024-10-22 PROBLEM — R10.2 PELVIC PAIN IN FEMALE: Status: ACTIVE | Noted: 2024-10-21

## 2024-10-25 ENCOUNTER — PATIENT MESSAGE (OUTPATIENT)
Dept: OBSTETRICS AND GYNECOLOGY | Facility: CLINIC | Age: 39
End: 2024-10-25
Payer: COMMERCIAL

## 2024-10-29 ENCOUNTER — CLINICAL SUPPORT (OUTPATIENT)
Dept: PREADMISSION TESTING | Facility: HOSPITAL | Age: 39
End: 2024-10-29
Payer: COMMERCIAL

## 2024-10-29 DIAGNOSIS — Z01.818 PREOPERATIVE EXAM FOR GYNECOLOGIC SURGERY: ICD-10-CM

## 2024-10-29 RX ORDER — MELATONIN 5 MG
CAPSULE ORAL AS NEEDED
COMMUNITY

## 2024-10-29 RX ORDER — DEXTROMETHORPHAN HYDROBROMIDE, GUAIFENESIN 5; 100 MG/5ML; MG/5ML
650 LIQUID ORAL EVERY 8 HOURS PRN
COMMUNITY

## 2024-11-04 ENCOUNTER — DOCUMENTATION (OUTPATIENT)
Dept: PREADMISSION TESTING | Facility: HOSPITAL | Age: 39
End: 2024-11-04

## 2024-11-04 ENCOUNTER — LAB (OUTPATIENT)
Dept: LAB | Facility: LAB | Age: 39
End: 2024-11-04
Payer: COMMERCIAL

## 2024-11-04 ENCOUNTER — PRE-ADMISSION TESTING (OUTPATIENT)
Dept: PREADMISSION TESTING | Facility: HOSPITAL | Age: 39
End: 2024-11-04
Payer: COMMERCIAL

## 2024-11-04 VITALS
DIASTOLIC BLOOD PRESSURE: 73 MMHG | RESPIRATION RATE: 16 BRPM | TEMPERATURE: 98 F | HEART RATE: 81 BPM | WEIGHT: 209.44 LBS | HEIGHT: 66 IN | OXYGEN SATURATION: 98 % | BODY MASS INDEX: 33.66 KG/M2 | SYSTOLIC BLOOD PRESSURE: 133 MMHG

## 2024-11-04 DIAGNOSIS — N92.0 MENORRHAGIA WITH REGULAR CYCLE: ICD-10-CM

## 2024-11-04 DIAGNOSIS — N80.03 ADENOMYOSIS: ICD-10-CM

## 2024-11-04 DIAGNOSIS — R10.2 PELVIC PAIN IN FEMALE: ICD-10-CM

## 2024-11-04 DIAGNOSIS — Z01.818 PREOP TESTING: Primary | ICD-10-CM

## 2024-11-04 DIAGNOSIS — Z01.818 PREOP TESTING: ICD-10-CM

## 2024-11-04 LAB
ANION GAP SERPL CALC-SCNC: 9 MMOL/L (ref 10–20)
APPEARANCE UR: CLEAR
BACTERIA #/AREA URNS AUTO: ABNORMAL /HPF
BILIRUB UR STRIP.AUTO-MCNC: NEGATIVE MG/DL
BUN SERPL-MCNC: 12 MG/DL (ref 6–23)
CALCIUM SERPL-MCNC: 8.3 MG/DL (ref 8.6–10.3)
CHLORIDE SERPL-SCNC: 104 MMOL/L (ref 98–107)
CO2 SERPL-SCNC: 27 MMOL/L (ref 21–32)
COLOR UR: ABNORMAL
CREAT SERPL-MCNC: 0.82 MG/DL (ref 0.5–1.05)
EGFRCR SERPLBLD CKD-EPI 2021: >90 ML/MIN/1.73M*2
ERYTHROCYTE [DISTWIDTH] IN BLOOD BY AUTOMATED COUNT: 13.7 % (ref 11.5–14.5)
GLUCOSE SERPL-MCNC: 99 MG/DL (ref 74–99)
GLUCOSE UR STRIP.AUTO-MCNC: NORMAL MG/DL
HCT VFR BLD AUTO: 40.8 % (ref 36–46)
HGB BLD-MCNC: 12.8 G/DL (ref 12–16)
HOLD SPECIMEN: NORMAL
KETONES UR STRIP.AUTO-MCNC: NEGATIVE MG/DL
LEUKOCYTE ESTERASE UR QL STRIP.AUTO: NEGATIVE
MCH RBC QN AUTO: 28.3 PG (ref 26–34)
MCHC RBC AUTO-ENTMCNC: 31.4 G/DL (ref 32–36)
MCV RBC AUTO: 90 FL (ref 80–100)
MUCOUS THREADS #/AREA URNS AUTO: ABNORMAL /LPF
NITRITE UR QL STRIP.AUTO: NEGATIVE
NRBC BLD-RTO: 0 /100 WBCS (ref 0–0)
PH UR STRIP.AUTO: 6.5 [PH]
PLATELET # BLD AUTO: 403 X10*3/UL (ref 150–450)
POTASSIUM SERPL-SCNC: 4.5 MMOL/L (ref 3.5–5.3)
PROT UR STRIP.AUTO-MCNC: NEGATIVE MG/DL
RBC # BLD AUTO: 4.52 X10*6/UL (ref 4–5.2)
RBC # UR STRIP.AUTO: ABNORMAL /UL
RBC #/AREA URNS AUTO: ABNORMAL /HPF
SODIUM SERPL-SCNC: 135 MMOL/L (ref 136–145)
SP GR UR STRIP.AUTO: 1.02
SQUAMOUS #/AREA URNS AUTO: ABNORMAL /HPF
UROBILINOGEN UR STRIP.AUTO-MCNC: NORMAL MG/DL
WBC # BLD AUTO: 7.4 X10*3/UL (ref 4.4–11.3)
WBC #/AREA URNS AUTO: ABNORMAL /HPF

## 2024-11-04 PROCEDURE — 80048 BASIC METABOLIC PNL TOTAL CA: CPT

## 2024-11-04 PROCEDURE — 99204 OFFICE O/P NEW MOD 45 MIN: CPT | Performed by: PHYSICIAN ASSISTANT

## 2024-11-04 PROCEDURE — 85027 COMPLETE CBC AUTOMATED: CPT

## 2024-11-04 PROCEDURE — 81001 URINALYSIS AUTO W/SCOPE: CPT

## 2024-11-04 PROCEDURE — 36415 COLL VENOUS BLD VENIPUNCTURE: CPT

## 2024-11-04 ASSESSMENT — ENCOUNTER SYMPTOMS
COUGH: 0
EXCESSIVE BLEEDING: 0
VOMITING: 0
SHORTNESS OF BREATH: 0
DOUBLE VISION: 0
NAUSEA: 0
WEAKNESS: 0
ABDOMINAL DISTENTION: 0
WOUND: 0
ABDOMINAL PAIN: 0
UNEXPECTED WEIGHT CHANGE: 0
VISUAL CHANGE: 0
DIARRHEA: 0
TROUBLE SWALLOWING: 0
BLOOD IN STOOL: 0
DYSPNEA AT REST: 0
LIGHT-HEADEDNESS: 0
DIFFICULTY URINATING: 0
FEVER: 0
EYE DISCHARGE: 0
CONFUSION: 0
HEMOPTYSIS: 0
RHINORRHEA: 0
DYSPNEA WITH EXERTION: 0
MYALGIAS: 0
SINUS CONGESTION: 0
DYSURIA: 0
CONSTIPATION: 0
SKIN CHANGES: 0
NECK STIFFNESS: 0
ARTHRALGIAS: 0
BRUISES/BLEEDS EASILY: 0
NUMBNESS: 0
WHEEZING: 0
LIMITED RANGE OF MOTION: 0
CHILLS: 0
EYE PAIN: 0
PALPITATIONS: 0
NECK PAIN: 0

## 2024-11-04 NOTE — H&P (VIEW-ONLY)
"[facility]/PAT Evaluation       Name: Verito Cross (Verito Cross)  /Age: 1985/39 y.o.       Date of Consult: 24    Referring Provider: Dr. Crouch    Surgery, Date, and Length: Robot Assisted Laparoscopic Total Hysterectomy; Bilateral Salpingectomy; Cystoscopy , 24, 150MIN    Verito Cross is a 39 year-old female who presents to the [facility] for perioperative risk assessment prior to surgery.    Patient presents with a primary diagnosis of menorrhagia and adenomyosis. Pt refers menses have been heavy \"my whole life\".  She refers to severe pelvic since spring of 2023. Pain can be as severe as 10/10 on pain scale, but pain is constant with baseline 4/10 pain.  She denies f/c/n/v.  No gross hematuria.      This note was created in part upon personal review of patient's medical records.      Patient is scheduled to have Robot Assisted Laparoscopic Total Hysterectomy; Bilateral Salpingectomy; Cystoscopy       Pt denies any past history of anesthetic complications such as PONV, awareness, prolonged sedation, dental damage, aspiration, cardiac arrest, difficult intubation, difficult I.V. access or unexpected hospital admissions.  NO malignant hyperthermia and or pseudocholinesterase deficiency.  No history of blood transfusions     The patient is not a Jainism and will accept blood and blood products if medically indicated.   Type and screen NOT sent.     Past Medical History:   Diagnosis Date    ADHD (attention deficit hyperactivity disorder) 22    Anxiety 05    Arthritis 2018    Depression 05    Other specified health status     No pertinent past medical history       Past Surgical History:   Procedure Laterality Date     SECTION, CLASSIC  2017     Section     SECTION, LOW TRANSVERSE  08, 13    CHOLECYSTECTOMY  2017    Cholecystectomy       Patient  reports being sexually active and has had partner(s) " who are male. She reports using the following methods of birth control/protection: Condom Male and OCP.    Family History   Problem Relation Name Age of Onset    Arthritis Mother Genevieve Padgett     Asthma Mother Genevieve Padgett     Depression Mother Genevieve Padgett     Miscarriages / Stillbirths Mother Genevieve Padgett     Arthritis Father Willy Rick     Cancer Father Willy Rick     Depression Father Willy Rick     Alcohol abuse Maternal Grandfather Hal Calero     Diabetes Maternal Grandfather Hal Calero     Blood Disorder Maternal Grandfather Hal Calero     Arthritis Maternal Grandmother Keshia Betts     COPD Maternal Grandmother Keshia Betts     Cancer Mother's Brother Bill Abdias     Cancer Father's Sister Yakelin Marts     Breast cancer Father's Sister Yakelin Marts     Colon cancer Father's Sister Yakelin Marts     Cancer Father's Sister Hanna Matthews     Breast cancer Father's Sister Hanna Matthews     Cancer Father's Brother Ricky Rick     Clotting disorder Sister Jason Padgett     Accidental death Mother's Sister Hazel Iraheta      Social History     Socioeconomic History    Marital status:      Spouse name: Not on file    Number of children: Not on file    Years of education: Not on file    Highest education level: Not on file   Occupational History    Not on file   Tobacco Use    Smoking status: Never    Smokeless tobacco: Never   Vaping Use    Vaping status: Some Days    Substances: THC    Devices: Pre-filled or refillable cartridge   Substance and Sexual Activity    Alcohol use: Yes     Comment: socially    Drug use: Yes     Frequency: 2.0 times per week     Types: Marijuana     Comment: weekly    Sexual activity: Yes     Partners: Male     Birth control/protection: Condom Male, OCP   Other Topics Concern    Not on file   Social History Narrative    Not on file     Social Drivers of Health     Financial Resource Strain: Not on file   Food Insecurity: No Food  Insecurity (9/19/2024)    Hunger Vital Sign     Worried About Running Out of Food in the Last Year: Never true     Ran Out of Food in the Last Year: Never true   Transportation Needs: No Transportation Needs (9/19/2024)    PRAPARE - Transportation     Lack of Transportation (Medical): No     Lack of Transportation (Non-Medical): No   Physical Activity: Not on file   Stress: Not on file   Social Connections: Not on file   Intimate Partner Violence: Not on file   Housing Stability: Not on file        No Known Allergies      Current Outpatient Medications:     acetaminophen (Tylenol 8 HOUR) 650 mg ER tablet, Take 1 tablet (650 mg) by mouth every 8 hours if needed for mild pain (1 - 3). Do not crush, chew, or split., Disp: , Rfl:     amphetamine-dextroamphetamine (Adderall) 20 mg tablet, Take 1 tablet (20 mg) by mouth 2 times a day., Disp: , Rfl:     cholecalciferol, vitD3,/vit K2 (vitamin D3-vitamin K2) 250 mcg (10,000 unit)-45 mcg capsule, Take by mouth., Disp: , Rfl:     DULoxetine (Cymbalta) 60 mg DR capsule, TAKE 1 CAPSULE BY MOUTH ONCE DAILY., Disp: 90 capsule, Rfl: 3    Estarylla 0.25-35 mg-mcg tablet, Take 1 tablet by mouth once daily. Skipping placebo, Disp: 90 tablet, Rfl: 3    ibuprofen/acetaminophen (DUAL ACTION PAIN RELIEVER ORAL), Take by mouth if needed., Disp: , Rfl:     melatonin 5 mg capsule, Take by mouth if needed., Disp: , Rfl:          PAT ROS:   Constitutional:    no fever   no chills   no unexpected weight change  Neuro/Psych:    no numbness   no weakness   no light-headedness   no confusion  Eyes:    no discharge   no pain   no vision loss   no diplopia   no visual disturbance  Ears:    no ear pain   no hearing loss   no tinnitus  Nose:    no nasal discharge   no sinus congestion   no epistaxis  Mouth:    no dental issues   no mouth pain   no oral bleeding   no mouth lesions  Throat:    no throat pain   no dysphagia  Neck:    no neck pain   no neck stiffness  Cardio:    Functional 4 Mets.  Patient denies SOB walking up 2 flights of stairs   Walking 2 dogs regularly; 30 minutes daily; cooking, cleaning, grocery shopping   no chest pain   no palpitations   no peripheral edema   no dyspnea   no HURT  Respiratory:    no cough   no wheezing   no hemoptysis   no shortness of breath  Endocrine:    no cold intolerance   no heat intolerance  GI:    no abdominal distention   no abdominal pain   no constipation   no diarrhea   no nausea   no vomiting   no blood in stool  :    no difficulty urinating   no dysuria   no oliguria  Musculoskeletal:    no arthralgias   no myalgias   no decreased ROM  Hematologic:    does not bruise/bleed easily   no excessive bleeding   no history of blood transfusion   no blood clots  Skin:   no skin changes   no sores/wound   no rash      Physical Exam  Constitutional:       General: She is not in acute distress.     Appearance: Normal appearance. She is not ill-appearing, toxic-appearing or diaphoretic.   HENT:      Head: Normocephalic and atraumatic.      Nose: Nose normal. No congestion or rhinorrhea.      Mouth/Throat:      Mouth: Mucous membranes are moist.      Pharynx: No posterior oropharyngeal erythema.   Eyes:      Extraocular Movements: Extraocular movements intact.      Conjunctiva/sclera: Conjunctivae normal.   Cardiovascular:      Rate and Rhythm: Normal rate and regular rhythm.      Pulses: Normal pulses.      Heart sounds: Normal heart sounds. No murmur heard.     No friction rub. No gallop.   Pulmonary:      Effort: Pulmonary effort is normal. No respiratory distress.      Breath sounds: Normal breath sounds. No stridor. No wheezing, rhonchi or rales.   Abdominal:      General: Bowel sounds are normal. There is no distension.      Palpations: Abdomen is soft. There is no mass.      Tenderness: There is no abdominal tenderness. There is no guarding or rebound.      Hernia: No hernia is present.   Musculoskeletal:         General: No swelling, tenderness, deformity  "or signs of injury. Normal range of motion.      Cervical back: Normal range of motion and neck supple. No rigidity or tenderness.   Skin:     General: Skin is warm and dry.      Coloration: Skin is not jaundiced or pale.      Findings: No bruising, erythema or rash.   Neurological:      General: No focal deficit present.      Mental Status: She is alert and oriented to person, place, and time.      Cranial Nerves: No cranial nerve deficit.      Sensory: No sensory deficit.      Motor: No weakness.      Coordination: Coordination normal.   Psychiatric:         Mood and Affect: Mood normal.         Behavior: Behavior normal.          PAT AIRWAY:   Airway:     Mallampati::  I    Neck ROM::  Full   No broken teeth, no dentures and no missing teeth          Visit Vitals  /73   Pulse 81   Temp 36.7 °C (98 °F)   Resp 16   Ht 1.675 m (5' 5.95\")   Wt 95 kg (209 lb 7 oz)   SpO2 98%   BMI 33.86 kg/m²   OB Status Having periods   Smoking Status Never   BSA 2.1 m²      LABS:  Lab Results   Component Value Date    WBC 7.4 11/04/2024    HGB 12.8 11/04/2024    HCT 40.8 11/04/2024    MCV 90 11/04/2024     11/04/2024      Lab Results   Component Value Date    GLUCOSE 99 11/04/2024    CALCIUM 8.3 (L) 11/04/2024     (L) 11/04/2024    K 4.5 11/04/2024    CO2 27 11/04/2024     11/04/2024    BUN 12 11/04/2024    CREATININE 0.82 11/04/2024      Urinalysis Microscopic  Order: [id number] - Reflex for Order [id number]   Status: Final result       Visible to patient: Yes (not seen)       Dx: Preop testing    0 Result Notes      Component  Ref Range & Units 08:30   WBC, Urine  1-5, NONE /HPF 1-5   RBC, Urine  NONE, 1-2, 3-5 /HPF 1-2   Squamous Epithelial Cells, Urine  Reference range not established. /HPF 1-9 (SPARSE)   Bacteria, Urine  NONE SEEN /HPF 1+ Abnormal    Mucus, Urine  Reference range not established. /LPF FEW   Resulting Agency [facility]             Specimen Collected: 11/04/24 08:30       Assessment and Plan: "     Patient is a 39-year-old female scheduled for a Robot Assisted Laparoscopic Total Hysterectomy; Bilateral Salpingectomy; Cystoscopy  with Dr. Crouch on 24.    Patient has no active cardiac symptoms.   Patient denies any chest pain, tightness, heaviness, pressure, radiating pain, palpitations, irregular heartbeats, lightheadedness, cough, congestion, shortness of breath, HURT, PND, near syncope, weight loss or gain.     RCRI  0  , 3.9 % Risk of MACE    Cardiac:  HLD - borderline - diet controlled.  Not currently taking any lipid lowering meds     Renal:  Hyponatremia  24 Na 138  24 Na 135    Hematology:  Patient instructed to ambulate as soon as possible postoperatively to decrease thromboembolic risk.   Initiate mechanical DVT prophylaxis as soon as possible and initiate chemical prophylaxis when deemed safe from a bleeding standpoint post surgery.     LABS: CBC ,BMP and UA flex ordered. Lab results reviewed and unremarkable with exception of hyponatremia of 135 which is stable.     STOP BAN    Caprini: 4    Risk assessment complete.  Patient is scheduled for a intermediate surgical risk procedure.        Preoperative medication instructions were provided and reviewed with the patient.  Any additional testing or evaluation was explained to the patient.  Nothing by mouth instructions were discussed and patient's questions were answered prior to conclusion to this encounter.  Patient verbalized understanding of preoperative instructions given in preadmission testing; discharge instructions available in EMR.    This note was dictated by a speech recognition.  Minor errors may have been detected in a speech recognition.

## 2024-11-04 NOTE — CPM/PAT NURSE NOTE
CPM/PAT Nurse Note      Name: Verito Cross (Verito Cross)  /Age: 1985/39 y.o.       Past Medical History:   Diagnosis Date    ADHD (attention deficit hyperactivity disorder) 22    Anxiety 05    Arthritis 2018    Depression 05    Other specified health status     No pertinent past medical history       Past Surgical History:   Procedure Laterality Date     SECTION, CLASSIC  2017     Section     SECTION, LOW TRANSVERSE  08, 13    CHOLECYSTECTOMY  2017    Cholecystectomy       Patient  reports being sexually active and has had partner(s) who are male. She reports using the following methods of birth control/protection: Condom Male and OCP.    Family History   Problem Relation Name Age of Onset    Arthritis Mother Genevieve Timoneri     Asthma Mother Genevieve Timoneri     Depression Mother Genevieve Timoneri     Miscarriages / Stillbirths Mother Genevieve Timoneri     Arthritis Father Willy Rick     Cancer Father Willy Rick     Depression Father Willy Rick     Alcohol abuse Maternal Grandfather Hal Calero     Diabetes Maternal Grandfather Hal Calero     Blood Disorder Maternal Grandfather Hal Calero     Arthritis Maternal Grandmother Keshia Roxane     COPD Maternal Grandmother Keshia Roxane     Cancer Mother's Brother Jacob Calero     Cancer Father's Sister Yakelin Marts     Breast cancer Father's Sister Yakelin Marts     Colon cancer Father's Sister Yakelin Marts     Cancer Father's Sister Hanna Matthews     Breast cancer Father's Sister Hanna Matthews     Cancer Father's Brother Ricky Rick     Clotting disorder Sister Jason Padgett     Accidental death Mother's Sister Hazel Iraheta        No Known Allergies    Prior to Admission medications    Medication Sig Start Date End Date Taking? Authorizing Provider   acetaminophen (Tylenol 8 HOUR) 650 mg ER tablet Take 1 tablet (650 mg) by mouth every 8 hours if needed  for mild pain (1 - 3). Do not crush, chew, or split.    Historical Provider, MD   amphetamine-dextroamphetamine (Adderall) 20 mg tablet Take 1 tablet (20 mg) by mouth 2 times a day. 6/14/23   Historical Provider, MD   cholecalciferol, vitD3,/vit K2 (vitamin D3-vitamin K2) 250 mcg (10,000 unit)-45 mcg capsule Take by mouth.    Historical Provider, MD   DULoxetine (Cymbalta) 60 mg DR capsule TAKE 1 CAPSULE BY MOUTH ONCE DAILY. 6/26/24   EMMA Dunn   Estarylla 0.25-35 mg-mcg tablet Take 1 tablet by mouth once daily. Skipping placebo 9/23/24 9/23/25  EMMA Hollis   ibuprofen/acetaminophen (DUAL ACTION PAIN RELIEVER ORAL) Take by mouth if needed.    Historical Provider, MD   melatonin 5 mg capsule Take by mouth if needed.    Historical Provider, MD CONRADO REA     DASI Risk Score      Flowsheet Row Questionnaire Series Submission from 10/24/2024 in [facility] with Generic Provider Astrid   Can you take care of yourself (eat, dress, bathe, or use toilet)?  2.75  filed at 10/24/2024 1058   Can you walk indoors, such as around your house? 1.75  filed at 10/24/2024 1058   Can you walk a block or two on level ground?  2.75  filed at 10/24/2024 1058   Can you climb a flight of stairs or walk up a hill? 5.5  filed at 10/24/2024 1058   Can you run a short distance? 8  filed at 10/24/2024 1058   Can you do light work around the house like dusting or washing dishes? 2.7  filed at 10/24/2024 1058   Can you do moderate work around the house like vacuuming, sweeping floors or carrying groceries? 3.5  filed at 10/24/2024 1058   Can you do heavy work around the house like scrubbing floors or lifting and moving heavy furniture?  8  filed at 10/24/2024 1058   Can you do yard work like raking leaves, weeding or pushing a mower? 4.5  filed at 10/24/2024 1058   Can you have sexual relations? 5.25  filed at 10/24/2024 1058   Can you participate in moderate recreational activities like golf, bowling, dancing,  doubles tennis or throwing a baseball or football? 6  filed at 10/24/2024 1058   Can you participate in strenous sports like swimming, singles tennis, football, basketball, or skiing? 7.5  filed at 10/24/2024 1058   DASI SCORE 58.2  filed at 10/24/2024 1058   METS Score (Will be calculated only when all the questions are answered) 9.9  filed at 10/24/2024 1058          Caprini DVT Assessment    No data to display       Modified Frailty Index    No data to display       CHADS2 Stroke Risk  Current as of 7 minutes ago        N/A 3 to 100%: High Risk   2 to < 3%: Medium Risk   0 to < 2%: Low Risk     Last Change: N/A          This score determines the patient's risk of having a stroke if the patient has atrial fibrillation.        This score is not applicable to this patient. Components are not calculated.          Revised Cardiac Risk Index    No data to display       Apfel Simplified Score    No data to display       Risk Analysis Index Results This Encounter    No data found in the last 10 encounters.       Stop Bang Score      Flowsheet Row Questionnaire Series Submission from 10/24/2024 in [facility] with Generic Provider Astrid   Do you snore loudly? 0  filed at 10/24/2024 1058   Do you often feel tired or fatigued after your sleep? 0  filed at 10/24/2024 1058   Has anyone ever observed you stop breathing in your sleep? 0  filed at 10/24/2024 1058   Do you have or are you being treated for high blood pressure? 0  filed at 10/24/2024 1058   Recent BMI (Calculated) 33.9  filed at 10/24/2024 1058   Is BMI greater than 35 kg/m2? 0=No  filed at 10/24/2024 1058   Age older than 50 years old? 0=No  filed at 10/24/2024 1058   Gender - Male 0=No  filed at 10/24/2024 1058          Prodigy: High Risk  Total Score: 0          ARISCAT Score for Postoperative Pulmonary Complications    No data to display       [place] Perioperative Risk for Myocardial Infarction or Cardiac Arrest (JERAD)    No data to display       .gen  Nurse  Plan of Action:

## 2024-11-04 NOTE — CPM/PAT H&P
"[facility]/PAT Evaluation       Name: Verito Cross (Verito Cross)  /Age: 1985/39 y.o.       Date of Consult: 24    Referring Provider: Dr. Crouch    Surgery, Date, and Length: Robot Assisted Laparoscopic Total Hysterectomy; Bilateral Salpingectomy; Cystoscopy , 24, 150MIN    Verito Cross is a 39 year-old female who presents to the [facility] for perioperative risk assessment prior to surgery.    Patient presents with a primary diagnosis of menorrhagia and adenomyosis. Pt refers menses have been heavy \"my whole life\".  She refers to severe pelvic since spring of 2023. Pain can be as severe as 10/10 on pain scale, but pain is constant with baseline 4/10 pain.  She denies f/c/n/v.  No gross hematuria.      This note was created in part upon personal review of patient's medical records.      Patient is scheduled to have Robot Assisted Laparoscopic Total Hysterectomy; Bilateral Salpingectomy; Cystoscopy       Pt denies any past history of anesthetic complications such as PONV, awareness, prolonged sedation, dental damage, aspiration, cardiac arrest, difficult intubation, difficult I.V. access or unexpected hospital admissions.  NO malignant hyperthermia and or pseudocholinesterase deficiency.  No history of blood transfusions     The patient is not a [facility] and will accept blood and blood products if medically indicated.   Type and screen NOT sent.     Past Medical History:   Diagnosis Date    ADHD (attention deficit hyperactivity disorder) 22    Anxiety 05    Arthritis 2018    Depression 05    Other specified health status     No pertinent past medical history       Past Surgical History:   Procedure Laterality Date     SECTION, CLASSIC  2017     Section     SECTION, LOW TRANSVERSE  08, 13    CHOLECYSTECTOMY  2017    Cholecystectomy       Patient  reports being sexually active and has had partner(s) " who are male. She reports using the following methods of birth control/protection: Condom Male and OCP.    Family History   Problem Relation Name Age of Onset    Arthritis Mother Genevieve Padgett     Asthma Mother Genevieve Padgett     Depression Mother Genevieve Padgett     Miscarriages / Stillbirths Mother Genevieve Padgett     Arthritis Father Willy Rick     Cancer Father Willy Rick     Depression Father Willy Rick     Alcohol abuse Maternal Grandfather Hal Calero     Diabetes Maternal Grandfather Hal Calero     Blood Disorder Maternal Grandfather Hal Calero     Arthritis Maternal Grandmother Keshia Betts     COPD Maternal Grandmother Keshia Betts     Cancer Mother's Brother Bill Abdias     Cancer Father's Sister Yakelin Marts     Breast cancer Father's Sister Yakelin Marts     Colon cancer Father's Sister Yakelin Marts     Cancer Father's Sister Hanna Matthews     Breast cancer Father's Sister Hanna Matthews     Cancer Father's Brother Ricky Rick     Clotting disorder Sister Jason Padgett     Accidental death Mother's Sister Hazel Iraheta      Social History     Socioeconomic History    Marital status:      Spouse name: Not on file    Number of children: Not on file    Years of education: Not on file    Highest education level: Not on file   Occupational History    Not on file   Tobacco Use    Smoking status: Never    Smokeless tobacco: Never   Vaping Use    Vaping status: Some Days    Substances: THC    Devices: Pre-filled or refillable cartridge   Substance and Sexual Activity    Alcohol use: Yes     Comment: socially    Drug use: Yes     Frequency: 2.0 times per week     Types: Marijuana     Comment: weekly    Sexual activity: Yes     Partners: Male     Birth control/protection: Condom Male, OCP   Other Topics Concern    Not on file   Social History Narrative    Not on file     Social Drivers of Health     Financial Resource Strain: Not on file   Food Insecurity: No Food  Insecurity (9/19/2024)    Hunger Vital Sign     Worried About Running Out of Food in the Last Year: Never true     Ran Out of Food in the Last Year: Never true   Transportation Needs: No Transportation Needs (9/19/2024)    PRAPARE - Transportation     Lack of Transportation (Medical): No     Lack of Transportation (Non-Medical): No   Physical Activity: Not on file   Stress: Not on file   Social Connections: Not on file   Intimate Partner Violence: Not on file   Housing Stability: Not on file        No Known Allergies      Current Outpatient Medications:     acetaminophen (Tylenol 8 HOUR) 650 mg ER tablet, Take 1 tablet (650 mg) by mouth every 8 hours if needed for mild pain (1 - 3). Do not crush, chew, or split., Disp: , Rfl:     amphetamine-dextroamphetamine (Adderall) 20 mg tablet, Take 1 tablet (20 mg) by mouth 2 times a day., Disp: , Rfl:     cholecalciferol, vitD3,/vit K2 (vitamin D3-vitamin K2) 250 mcg (10,000 unit)-45 mcg capsule, Take by mouth., Disp: , Rfl:     DULoxetine (Cymbalta) 60 mg DR capsule, TAKE 1 CAPSULE BY MOUTH ONCE DAILY., Disp: 90 capsule, Rfl: 3    Estarylla 0.25-35 mg-mcg tablet, Take 1 tablet by mouth once daily. Skipping placebo, Disp: 90 tablet, Rfl: 3    ibuprofen/acetaminophen (DUAL ACTION PAIN RELIEVER ORAL), Take by mouth if needed., Disp: , Rfl:     melatonin 5 mg capsule, Take by mouth if needed., Disp: , Rfl:          PAT ROS:   Constitutional:    no fever   no chills   no unexpected weight change  Neuro/Psych:    no numbness   no weakness   no light-headedness   no confusion  Eyes:    no discharge   no pain   no vision loss   no diplopia   no visual disturbance  Ears:    no ear pain   no hearing loss   no tinnitus  Nose:    no nasal discharge   no sinus congestion   no epistaxis  Mouth:    no dental issues   no mouth pain   no oral bleeding   no mouth lesions  Throat:    no throat pain   no dysphagia  Neck:    no neck pain   no neck stiffness  Cardio:    Functional 4 Mets.  Patient denies SOB walking up 2 flights of stairs   Walking 2 dogs regularly; 30 minutes daily; cooking, cleaning, grocery shopping   no chest pain   no palpitations   no peripheral edema   no dyspnea   no HURT  Respiratory:    no cough   no wheezing   no hemoptysis   no shortness of breath  Endocrine:    no cold intolerance   no heat intolerance  GI:    no abdominal distention   no abdominal pain   no constipation   no diarrhea   no nausea   no vomiting   no blood in stool  :    no difficulty urinating   no dysuria   no oliguria  Musculoskeletal:    no arthralgias   no myalgias   no decreased ROM  Hematologic:    does not bruise/bleed easily   no excessive bleeding   no history of blood transfusion   no blood clots  Skin:   no skin changes   no sores/wound   no rash      Physical Exam  Constitutional:       General: She is not in acute distress.     Appearance: Normal appearance. She is not ill-appearing, toxic-appearing or diaphoretic.   HENT:      Head: Normocephalic and atraumatic.      Nose: Nose normal. No congestion or rhinorrhea.      Mouth/Throat:      Mouth: Mucous membranes are moist.      Pharynx: No posterior oropharyngeal erythema.   Eyes:      Extraocular Movements: Extraocular movements intact.      Conjunctiva/sclera: Conjunctivae normal.   Cardiovascular:      Rate and Rhythm: Normal rate and regular rhythm.      Pulses: Normal pulses.      Heart sounds: Normal heart sounds. No murmur heard.     No friction rub. No gallop.   Pulmonary:      Effort: Pulmonary effort is normal. No respiratory distress.      Breath sounds: Normal breath sounds. No stridor. No wheezing, rhonchi or rales.   Abdominal:      General: Bowel sounds are normal. There is no distension.      Palpations: Abdomen is soft. There is no mass.      Tenderness: There is no abdominal tenderness. There is no guarding or rebound.      Hernia: No hernia is present.   Musculoskeletal:         General: No swelling, tenderness, deformity  "or signs of injury. Normal range of motion.      Cervical back: Normal range of motion and neck supple. No rigidity or tenderness.   Skin:     General: Skin is warm and dry.      Coloration: Skin is not jaundiced or pale.      Findings: No bruising, erythema or rash.   Neurological:      General: No focal deficit present.      Mental Status: She is alert and oriented to person, place, and time.      Cranial Nerves: No cranial nerve deficit.      Sensory: No sensory deficit.      Motor: No weakness.      Coordination: Coordination normal.   Psychiatric:         Mood and Affect: Mood normal.         Behavior: Behavior normal.          PAT AIRWAY:   Airway:     Mallampati::  I    Neck ROM::  Full   No broken teeth, no dentures and no missing teeth          Visit Vitals  /73   Pulse 81   Temp 36.7 °C (98 °F)   Resp 16   Ht 1.675 m (5' 5.95\")   Wt 95 kg (209 lb 7 oz)   SpO2 98%   BMI 33.86 kg/m²   OB Status Having periods   Smoking Status Never   BSA 2.1 m²      LABS:  Lab Results   Component Value Date    WBC 7.4 11/04/2024    HGB 12.8 11/04/2024    HCT 40.8 11/04/2024    MCV 90 11/04/2024     11/04/2024      Lab Results   Component Value Date    GLUCOSE 99 11/04/2024    CALCIUM 8.3 (L) 11/04/2024     (L) 11/04/2024    K 4.5 11/04/2024    CO2 27 11/04/2024     11/04/2024    BUN 12 11/04/2024    CREATININE 0.82 11/04/2024      Urinalysis Microscopic  Order: [id number] - Reflex for Order [id number]   Status: Final result       Visible to patient: Yes (not seen)       Dx: Preop testing    0 Result Notes      Component  Ref Range & Units 08:30   WBC, Urine  1-5, NONE /HPF 1-5   RBC, Urine  NONE, 1-2, 3-5 /HPF 1-2   Squamous Epithelial Cells, Urine  Reference range not established. /HPF 1-9 (SPARSE)   Bacteria, Urine  NONE SEEN /HPF 1+ Abnormal    Mucus, Urine  Reference range not established. /LPF FEW   Resulting Agency [facility]             Specimen Collected: 11/04/24 08:30       Assessment and Plan: "     Patient is a 39-year-old female scheduled for a Robot Assisted Laparoscopic Total Hysterectomy; Bilateral Salpingectomy; Cystoscopy  with Dr. Crouch on 24.    Patient has no active cardiac symptoms.   Patient denies any chest pain, tightness, heaviness, pressure, radiating pain, palpitations, irregular heartbeats, lightheadedness, cough, congestion, shortness of breath, HURT, PND, near syncope, weight loss or gain.     RCRI  0  , 3.9 % Risk of MACE    Cardiac:  HLD - borderline - diet controlled.  Not currently taking any lipid lowering meds     Renal:  Hyponatremia  24 Na 138  24 Na 135    Hematology:  Patient instructed to ambulate as soon as possible postoperatively to decrease thromboembolic risk.   Initiate mechanical DVT prophylaxis as soon as possible and initiate chemical prophylaxis when deemed safe from a bleeding standpoint post surgery.     LABS: CBC ,BMP and UA flex ordered. Lab results reviewed and unremarkable with exception of hyponatremia of 135 which is stable.     STOP BAN    Caprini: 4    Risk assessment complete.  Patient is scheduled for a intermediate surgical risk procedure.        Preoperative medication instructions were provided and reviewed with the patient.  Any additional testing or evaluation was explained to the patient.  Nothing by mouth instructions were discussed and patient's questions were answered prior to conclusion to this encounter.  Patient verbalized understanding of preoperative instructions given in preadmission testing; discharge instructions available in EMR.    This note was dictated by a speech recognition.  Minor errors may have been detected in a speech recognition.

## 2024-11-04 NOTE — CPM/PAT NURSE NOTE
CPM/PAT Nurse Note      Name: Verito Cross (Verito Cross)  /Age: 1985/39 y.o.       Past Medical History:   Diagnosis Date    ADHD (attention deficit hyperactivity disorder) 22    Anxiety 05    Arthritis 2018    Depression 05    Other specified health status     No pertinent past medical history       Past Surgical History:   Procedure Laterality Date     SECTION, CLASSIC  2017     Section     SECTION, LOW TRANSVERSE  08, 13    CHOLECYSTECTOMY  2017    Cholecystectomy       Patient  reports being sexually active and has had partner(s) who are male. She reports using the following methods of birth control/protection: Condom Male and OCP.    Family History   Problem Relation Name Age of Onset    Arthritis Mother Genevieve Timoneri     Asthma Mother Genevieve Timoneri     Depression Mother Genevieve Timoneri     Miscarriages / Stillbirths Mother Genevieve Timoneri     Arthritis Father Willy Rick     Cancer Father Willy Rick     Depression Father Willy Rick     Alcohol abuse Maternal Grandfather Hal Calero     Diabetes Maternal Grandfather Hal Calero     Blood Disorder Maternal Grandfather Hal Calero     Arthritis Maternal Grandmother Keshia Roxane     COPD Maternal Grandmother Keshia Roxane     Cancer Mother's Brother Jacob Calero     Cancer Father's Sister Yakelin Marts     Breast cancer Father's Sister Yakelin Marts     Colon cancer Father's Sister Yakelin Marts     Cancer Father's Sister Hanna Matthews     Breast cancer Father's Sister Hanna Matthews     Cancer Father's Brother Ricky Rick     Clotting disorder Sister Jason Padgett     Accidental death Mother's Sister Hazel Iraheta        No Known Allergies    Prior to Admission medications    Medication Sig Start Date End Date Taking? Authorizing Provider   acetaminophen (Tylenol 8 HOUR) 650 mg ER tablet Take 1 tablet (650 mg) by mouth every 8 hours if needed  for mild pain (1 - 3). Do not crush, chew, or split.    Historical Provider, MD   amphetamine-dextroamphetamine (Adderall) 20 mg tablet Take 1 tablet (20 mg) by mouth 2 times a day. 6/14/23   Historical Provider, MD   cholecalciferol, vitD3,/vit K2 (vitamin D3-vitamin K2) 250 mcg (10,000 unit)-45 mcg capsule Take by mouth.    Historical Provider, MD   DULoxetine (Cymbalta) 60 mg DR capsule TAKE 1 CAPSULE BY MOUTH ONCE DAILY. 6/26/24   EMMA Dunn   Estarylla 0.25-35 mg-mcg tablet Take 1 tablet by mouth once daily. Skipping placebo 9/23/24 9/23/25  EMMA Hollis   ibuprofen/acetaminophen (DUAL ACTION PAIN RELIEVER ORAL) Take by mouth if needed.    Historical Provider, MD   melatonin 5 mg capsule Take by mouth if needed.    Historical Provider, MD CONRADO REA     DASI Risk Score      Flowsheet Row Questionnaire Series Submission from 10/24/2024 in [facility] with Generic Provider Astrid   Can you take care of yourself (eat, dress, bathe, or use toilet)?  2.75  filed at 10/24/2024 1058   Can you walk indoors, such as around your house? 1.75  filed at 10/24/2024 1058   Can you walk a block or two on level ground?  2.75  filed at 10/24/2024 1058   Can you climb a flight of stairs or walk up a hill? 5.5  filed at 10/24/2024 1058   Can you run a short distance? 8  filed at 10/24/2024 1058   Can you do light work around the house like dusting or washing dishes? 2.7  filed at 10/24/2024 1058   Can you do moderate work around the house like vacuuming, sweeping floors or carrying groceries? 3.5  filed at 10/24/2024 1058   Can you do heavy work around the house like scrubbing floors or lifting and moving heavy furniture?  8  filed at 10/24/2024 1058   Can you do yard work like raking leaves, weeding or pushing a mower? 4.5  filed at 10/24/2024 1058   Can you have sexual relations? 5.25  filed at 10/24/2024 1058   Can you participate in moderate recreational activities like golf, bowling, dancing,  doubles tennis or throwing a baseball or football? 6  filed at 10/24/2024 1058   Can you participate in strenous sports like swimming, singles tennis, football, basketball, or skiing? 7.5  filed at 10/24/2024 1058   DASI SCORE 58.2  filed at 10/24/2024 1058   METS Score (Will be calculated only when all the questions are answered) 9.9  filed at 10/24/2024 1058          Caprini DVT Assessment    No data to display       Modified Frailty Index    No data to display       CHADS2 Stroke Risk  Current as of 8 minutes ago        N/A 3 to 100%: High Risk   2 to < 3%: Medium Risk   0 to < 2%: Low Risk     Last Change: N/A          This score determines the patient's risk of having a stroke if the patient has atrial fibrillation.        This score is not applicable to this patient. Components are not calculated.          Revised Cardiac Risk Index    No data to display       Apfel Simplified Score    No data to display       Risk Analysis Index Results This Encounter    No data found in the last 10 encounters.       Stop Bang Score      Flowsheet Row Questionnaire Series Submission from 10/24/2024 in [facility] with Generic Provider Astrid   Do you snore loudly? 0  filed at 10/24/2024 1058   Do you often feel tired or fatigued after your sleep? 0  filed at 10/24/2024 1058   Has anyone ever observed you stop breathing in your sleep? 0  filed at 10/24/2024 1058   Do you have or are you being treated for high blood pressure? 0  filed at 10/24/2024 1058   Recent BMI (Calculated) 33.9  filed at 10/24/2024 1058   Is BMI greater than 35 kg/m2? 0=No  filed at 10/24/2024 1058   Age older than 50 years old? 0=No  filed at 10/24/2024 1058   Gender - Male 0=No  filed at 10/24/2024 1058          Prodigy: High Risk  Total Score: 0          ARISCAT Score for Postoperative Pulmonary Complications    No data to display       Munguia Perioperative Risk for Myocardial Infarction or Cardiac Arrest (JERAD)    No data to display     After Visit  Summary (AVS) reviewed and patient verbalized good understanding of medications and NPO instructions.       Nurse Plan of Action:

## 2024-11-04 NOTE — PREPROCEDURE INSTRUCTIONS
Medication List            Accurate as of November 4, 2024  7:58 AM. Always use your most recent med list.                acetaminophen 650 mg ER tablet  Commonly known as: Tylenol 8 HOUR  Medication Adjustments for Surgery: Take on the morning of surgery  Notes to patient: If needed     amphetamine-dextroamphetamine 20 mg tablet  Commonly known as: Adderall  Medication Adjustments for Surgery: Do Not take on the morning of surgery     DUAL ACTION PAIN RELIEVER ORAL  Additional Medication Adjustments for Surgery: Take last dose 7 days before surgery     DULoxetine 60 mg DR capsule  Commonly known as: Cymbalta  TAKE 1 CAPSULE BY MOUTH ONCE DAILY.  Medication Adjustments for Surgery: Take on the morning of surgery     Estarylla 0.25-35 mg-mcg tablet  Generic drug: norgestimate-ethinyl estradioL  Take 1 tablet by mouth once daily. Skipping placebo  Medication Adjustments for Surgery: Take on the morning of surgery     melatonin 5 mg capsule  Medication Adjustments for Surgery: Take/Use as prescribed     vitamin D3-vitamin K2 250 mcg (10,000 unit)-45 mcg capsule  Additional Medication Adjustments for Surgery: Take last dose 7 days before surgery                          **Concerning above medication instructions, if medication is normally taken at night, continue normal schedule.**  **DO NOT TAKE NIGHT PRIOR AND MORNING OF SURGERY**    CONTACT SURGEON'S OFFICE IF YOU DEVELOP:  * Fever = 100.4 F   * New respiratory symptoms (e.g. cough, shortness of breath, respiratory distress, sore throat)  * Recent loss of taste or smell  *Flu like symptoms such as headache, fatigue or gastrointestinal symptoms  * You develop any open sores, shingles, burning or painful urination   AND/OR:  * You no longer wish to have the surgery.  * Any other personal circumstances change that may lead to the need to cancel or defer this surgery.  *You were admitted to any hospital within one week of your planned procedure.    SMOKING:  *Quitting  smoking can make a huge difference to your health and recovery from surgery.    *If you need help with quitting, call [phone].    THE DAY BEFORE SURGERY:  *Do not eat any food after midnight the night before surgery.   *You are permitted to drink clear liquids (i.e. water, black coffee (no milk or cream), tea, apple juice and electrolyte drinks (gatorade)) up to 13.5 ounces, up to 2 hours before your arrival time.  *You may chew gum until 2 hours before your surgery    SURGICAL TIME  *You will be contacted between 2 p.m. and 6 p.m. the business day before your surgery with your arrival time.  *If you haven't received a call by 6pm, call [phone].  *Scheduled surgery times may change and you will be notified if this occurs-check your personal voicemail for any updates.    ON THE MORNING OF SURGERY:  *Wear comfortable, loose fitting clothing.   *Do not use moisturizers, creams, lotions or perfume.  *All jewelry and valuables should be left at home.  *Prosthetic devices such as contact lenses, hearing aids, dentures, eyelash extensions, hairpins and body piercing must be removed before surgery.    BRING WITH YOU:  *Photo ID and insurance card  *Current list of medicines and allergies  *Pacemaker/Defibrillator/Heart stent cards  *CPAP machine and mask  *Slings/splints/crutches  *Copy of your complete Advanced Directive/DHPOA-if applicable  *Neurostimulator implant remote    PARKING AND ARRIVAL:  *Check in at the Main Entrance desk and let them know you are here for surgery.  *You will be directed to the 2nd floor surgical waiting area.    AFTER OUTPATIENT SURGERY:  *A responsible adult MUST accompany you at the time of discharge and stay with you for 24 hours after your surgery.  *You may NOT drive yourself home after surgery.  *You may use a taxi or ride sharing service ([employer], Uber) to return home ONLY if you are accompanied by a friend or family member.  *Instructions for resuming your medications will be  provided by your surgeon.

## 2024-11-12 ENCOUNTER — ANESTHESIA EVENT (OUTPATIENT)
Dept: OPERATING ROOM | Facility: HOSPITAL | Age: 39
End: 2024-11-12
Payer: COMMERCIAL

## 2024-11-13 ENCOUNTER — ANESTHESIA (OUTPATIENT)
Dept: OPERATING ROOM | Facility: HOSPITAL | Age: 39
End: 2024-11-13
Payer: COMMERCIAL

## 2024-11-13 ENCOUNTER — HOSPITAL ENCOUNTER (OUTPATIENT)
Facility: HOSPITAL | Age: 39
Setting detail: OUTPATIENT SURGERY
Discharge: HOME | End: 2024-11-13
Attending: OBSTETRICS & GYNECOLOGY | Admitting: OBSTETRICS & GYNECOLOGY
Payer: COMMERCIAL

## 2024-11-13 VITALS
BODY MASS INDEX: 33.9 KG/M2 | HEART RATE: 68 BPM | RESPIRATION RATE: 14 BRPM | TEMPERATURE: 97.3 F | SYSTOLIC BLOOD PRESSURE: 140 MMHG | DIASTOLIC BLOOD PRESSURE: 78 MMHG | OXYGEN SATURATION: 98 % | WEIGHT: 209.66 LBS

## 2024-11-13 DIAGNOSIS — N92.0 MENORRHAGIA WITH REGULAR CYCLE: ICD-10-CM

## 2024-11-13 DIAGNOSIS — G89.18 POSTOPERATIVE PAIN: ICD-10-CM

## 2024-11-13 DIAGNOSIS — N80.03 ADENOMYOSIS: Primary | ICD-10-CM

## 2024-11-13 DIAGNOSIS — R10.2 PELVIC PAIN IN FEMALE: ICD-10-CM

## 2024-11-13 PROBLEM — M19.90 OSTEOARTHRITIS: Status: ACTIVE | Noted: 2024-11-13

## 2024-11-13 PROBLEM — E66.9 OBESITY: Status: ACTIVE | Noted: 2024-11-13

## 2024-11-13 LAB — PREGNANCY TEST URINE, POC: NEGATIVE

## 2024-11-13 PROCEDURE — 3700000001 HC GENERAL ANESTHESIA TIME - INITIAL BASE CHARGE: Performed by: OBSTETRICS & GYNECOLOGY

## 2024-11-13 PROCEDURE — 3600000017 HC OR TIME - EACH INCREMENTAL 1 MINUTE - PROCEDURE LEVEL SIX: Performed by: OBSTETRICS & GYNECOLOGY

## 2024-11-13 PROCEDURE — 81025 URINE PREGNANCY TEST: CPT | Performed by: OBSTETRICS & GYNECOLOGY

## 2024-11-13 PROCEDURE — 7100000001 HC RECOVERY ROOM TIME - INITIAL BASE CHARGE: Performed by: OBSTETRICS & GYNECOLOGY

## 2024-11-13 PROCEDURE — 3700000002 HC GENERAL ANESTHESIA TIME - EACH INCREMENTAL 1 MINUTE: Performed by: OBSTETRICS & GYNECOLOGY

## 2024-11-13 PROCEDURE — 2500000005 HC RX 250 GENERAL PHARMACY W/O HCPCS: Performed by: OBSTETRICS & GYNECOLOGY

## 2024-11-13 PROCEDURE — 7100000002 HC RECOVERY ROOM TIME - EACH INCREMENTAL 1 MINUTE: Performed by: OBSTETRICS & GYNECOLOGY

## 2024-11-13 PROCEDURE — 2780000003 HC OR 278 NO HCPCS: Performed by: OBSTETRICS & GYNECOLOGY

## 2024-11-13 PROCEDURE — 7100000010 HC PHASE TWO TIME - EACH INCREMENTAL 1 MINUTE: Performed by: OBSTETRICS & GYNECOLOGY

## 2024-11-13 PROCEDURE — 2720000007 HC OR 272 NO HCPCS: Performed by: OBSTETRICS & GYNECOLOGY

## 2024-11-13 PROCEDURE — 7100000009 HC PHASE TWO TIME - INITIAL BASE CHARGE: Performed by: OBSTETRICS & GYNECOLOGY

## 2024-11-13 PROCEDURE — 58571 TLH W/T/O 250 G OR LESS: CPT | Performed by: OBSTETRICS & GYNECOLOGY

## 2024-11-13 PROCEDURE — 3600000018 HC OR TIME - INITIAL BASE CHARGE - PROCEDURE LEVEL SIX: Performed by: OBSTETRICS & GYNECOLOGY

## 2024-11-13 PROCEDURE — 2500000001 HC RX 250 WO HCPCS SELF ADMINISTERED DRUGS (ALT 637 FOR MEDICARE OP): Performed by: ANESTHESIOLOGY

## 2024-11-13 PROCEDURE — 2500000004 HC RX 250 GENERAL PHARMACY W/ HCPCS (ALT 636 FOR OP/ED)

## 2024-11-13 PROCEDURE — S2900 ROBOTIC SURGICAL SYSTEM: HCPCS | Performed by: OBSTETRICS & GYNECOLOGY

## 2024-11-13 PROCEDURE — A58571 PR LAPAROSCOPY W TOT HYSTERECTUTERUS <=250 GRAM  W TUBE/OVARY: Performed by: ANESTHESIOLOGY

## 2024-11-13 PROCEDURE — 2500000001 HC RX 250 WO HCPCS SELF ADMINISTERED DRUGS (ALT 637 FOR MEDICARE OP): Performed by: OBSTETRICS & GYNECOLOGY

## 2024-11-13 PROCEDURE — A58571 PR LAPAROSCOPY W TOT HYSTERECTUTERUS <=250 GRAM  W TUBE/OVARY

## 2024-11-13 PROCEDURE — 2500000004 HC RX 250 GENERAL PHARMACY W/ HCPCS (ALT 636 FOR OP/ED): Performed by: OBSTETRICS & GYNECOLOGY

## 2024-11-13 PROCEDURE — 88307 TISSUE EXAM BY PATHOLOGIST: CPT | Mod: TC,AHULAB | Performed by: OBSTETRICS & GYNECOLOGY

## 2024-11-13 RX ORDER — HYDROMORPHONE HYDROCHLORIDE 1 MG/ML
INJECTION, SOLUTION INTRAMUSCULAR; INTRAVENOUS; SUBCUTANEOUS AS NEEDED
Status: DISCONTINUED | OUTPATIENT
Start: 2024-11-13 | End: 2024-11-13

## 2024-11-13 RX ORDER — HYDROMORPHONE HYDROCHLORIDE 1 MG/ML
0.2 INJECTION, SOLUTION INTRAMUSCULAR; INTRAVENOUS; SUBCUTANEOUS EVERY 5 MIN PRN
Status: DISCONTINUED | OUTPATIENT
Start: 2024-11-13 | End: 2024-11-13 | Stop reason: HOSPADM

## 2024-11-13 RX ORDER — OXYCODONE HYDROCHLORIDE 5 MG/1
5 TABLET ORAL EVERY 6 HOURS PRN
Qty: 15 TABLET | Refills: 0 | Status: SHIPPED | OUTPATIENT
Start: 2024-11-13

## 2024-11-13 RX ORDER — SODIUM CHLORIDE 9 MG/ML
INJECTION, SOLUTION INTRAVENOUS CONTINUOUS PRN
Status: DISCONTINUED | OUTPATIENT
Start: 2024-11-13 | End: 2024-11-13

## 2024-11-13 RX ORDER — WATER 1 ML/ML
IRRIGANT IRRIGATION AS NEEDED
Status: DISCONTINUED | OUTPATIENT
Start: 2024-11-13 | End: 2024-11-13 | Stop reason: HOSPADM

## 2024-11-13 RX ORDER — IBUPROFEN 800 MG/1
800 TABLET ORAL 3 TIMES DAILY PRN
Qty: 90 TABLET | Refills: 0 | Status: SHIPPED | OUTPATIENT
Start: 2024-11-13

## 2024-11-13 RX ORDER — BUPIVACAINE HYDROCHLORIDE 5 MG/ML
INJECTION, SOLUTION PERINEURAL AS NEEDED
Status: DISCONTINUED | OUTPATIENT
Start: 2024-11-13 | End: 2024-11-13 | Stop reason: HOSPADM

## 2024-11-13 RX ORDER — KETOROLAC TROMETHAMINE 30 MG/ML
INJECTION, SOLUTION INTRAMUSCULAR; INTRAVENOUS AS NEEDED
Status: DISCONTINUED | OUTPATIENT
Start: 2024-11-13 | End: 2024-11-13

## 2024-11-13 RX ORDER — LIDOCAINE HYDROCHLORIDE 10 MG/ML
0.1 INJECTION, SOLUTION EPIDURAL; INFILTRATION; INTRACAUDAL; PERINEURAL ONCE
Status: DISCONTINUED | OUTPATIENT
Start: 2024-11-13 | End: 2024-11-13 | Stop reason: HOSPADM

## 2024-11-13 RX ORDER — FENTANYL CITRATE 50 UG/ML
INJECTION, SOLUTION INTRAMUSCULAR; INTRAVENOUS AS NEEDED
Status: DISCONTINUED | OUTPATIENT
Start: 2024-11-13 | End: 2024-11-13

## 2024-11-13 RX ORDER — FLUORESCEIN 500 MG/ML
INJECTION INTRAVENOUS CONTINUOUS PRN
Status: COMPLETED | OUTPATIENT
Start: 2024-11-13 | End: 2024-11-13

## 2024-11-13 RX ORDER — MIDAZOLAM HYDROCHLORIDE 1 MG/ML
INJECTION INTRAMUSCULAR; INTRAVENOUS AS NEEDED
Status: DISCONTINUED | OUTPATIENT
Start: 2024-11-13 | End: 2024-11-13

## 2024-11-13 RX ORDER — HYDROMORPHONE HYDROCHLORIDE 1 MG/ML
0.5 INJECTION, SOLUTION INTRAMUSCULAR; INTRAVENOUS; SUBCUTANEOUS EVERY 5 MIN PRN
Status: DISCONTINUED | OUTPATIENT
Start: 2024-11-13 | End: 2024-11-13 | Stop reason: HOSPADM

## 2024-11-13 RX ORDER — LIDOCAINE HYDROCHLORIDE 20 MG/ML
INJECTION, SOLUTION EPIDURAL; INFILTRATION; INTRACAUDAL; PERINEURAL AS NEEDED
Status: DISCONTINUED | OUTPATIENT
Start: 2024-11-13 | End: 2024-11-13

## 2024-11-13 RX ORDER — PROPOFOL 10 MG/ML
INJECTION, EMULSION INTRAVENOUS AS NEEDED
Status: DISCONTINUED | OUTPATIENT
Start: 2024-11-13 | End: 2024-11-13

## 2024-11-13 RX ORDER — OXYCODONE HYDROCHLORIDE 5 MG/1
5 TABLET ORAL EVERY 4 HOURS PRN
Status: DISCONTINUED | OUTPATIENT
Start: 2024-11-13 | End: 2024-11-13 | Stop reason: HOSPADM

## 2024-11-13 RX ORDER — ONDANSETRON HYDROCHLORIDE 2 MG/ML
INJECTION, SOLUTION INTRAVENOUS AS NEEDED
Status: DISCONTINUED | OUTPATIENT
Start: 2024-11-13 | End: 2024-11-13

## 2024-11-13 RX ORDER — CEFAZOLIN 1 G/1
INJECTION, POWDER, FOR SOLUTION INTRAVENOUS AS NEEDED
Status: DISCONTINUED | OUTPATIENT
Start: 2024-11-13 | End: 2024-11-13

## 2024-11-13 RX ORDER — MEPERIDINE HYDROCHLORIDE 25 MG/ML
12.5 INJECTION INTRAMUSCULAR; INTRAVENOUS; SUBCUTANEOUS EVERY 10 MIN PRN
Status: DISCONTINUED | OUTPATIENT
Start: 2024-11-13 | End: 2024-11-13 | Stop reason: HOSPADM

## 2024-11-13 RX ORDER — CELECOXIB 200 MG/1
400 CAPSULE ORAL ONCE
Status: COMPLETED | OUTPATIENT
Start: 2024-11-13 | End: 2024-11-13

## 2024-11-13 RX ORDER — ROCURONIUM BROMIDE 10 MG/ML
INJECTION, SOLUTION INTRAVENOUS AS NEEDED
Status: DISCONTINUED | OUTPATIENT
Start: 2024-11-13 | End: 2024-11-13

## 2024-11-13 RX ORDER — ONDANSETRON HYDROCHLORIDE 2 MG/ML
4 INJECTION, SOLUTION INTRAVENOUS ONCE AS NEEDED
Status: DISCONTINUED | OUTPATIENT
Start: 2024-11-13 | End: 2024-11-13 | Stop reason: HOSPADM

## 2024-11-13 ASSESSMENT — PAIN - FUNCTIONAL ASSESSMENT
PAIN_FUNCTIONAL_ASSESSMENT: 0-10

## 2024-11-13 ASSESSMENT — PAIN SCALES - GENERAL
PAINLEVEL_OUTOF10: 3
PAIN_LEVEL: 0
PAINLEVEL_OUTOF10: 3
PAINLEVEL_OUTOF10: 0 - NO PAIN

## 2024-11-13 ASSESSMENT — COLUMBIA-SUICIDE SEVERITY RATING SCALE - C-SSRS
6. HAVE YOU EVER DONE ANYTHING, STARTED TO DO ANYTHING, OR PREPARED TO DO ANYTHING TO END YOUR LIFE?: NO
1. IN THE PAST MONTH, HAVE YOU WISHED YOU WERE DEAD OR WISHED YOU COULD GO TO SLEEP AND NOT WAKE UP?: NO
2. HAVE YOU ACTUALLY HAD ANY THOUGHTS OF KILLING YOURSELF?: NO

## 2024-11-13 ASSESSMENT — PAIN DESCRIPTION - LOCATION: LOCATION: ABDOMEN

## 2024-11-13 NOTE — ANESTHESIA POSTPROCEDURE EVALUATION
Patient: Verito Cross    Procedure Summary       Date: 11/13/24 Room / Location: [facility] A OR 08 / Virtual U A OR    Anesthesia Start: 0736 Anesthesia Stop: 1013    Procedure: Robot Assisted Laparoscopic Total Hysterectomy; Bilateral Salpingectomy; Cystoscopy (Pelvis) Diagnosis:       Menorrhagia with regular cycle      Pelvic pain in female      (Menorrhagia with regular cycle [N92.0])      (Pelvic pain in female [R10.2])    Surgeons: Keaton Crouch MD Responsible Provider: Mj Mondragon MD    Anesthesia Type: general ASA Status: 2            Anesthesia Type: general    Vitals Value Taken Time   /88 11/13/24 1056   Temp 36.4 °C (97.5 °F) 11/13/24 1010   Pulse 63 11/13/24 1056   Resp 13 11/13/24 1045   SpO2 97 % 11/13/24 1056   Vitals shown include unfiled device data.    Anesthesia Post Evaluation    Patient location during evaluation: bedside  Patient participation: complete - patient cannot participate  Level of consciousness: awake  Pain score: 0  Pain management: adequate  Airway patency: patent  Cardiovascular status: acceptable and hemodynamically stable  Respiratory status: acceptable and nonlabored ventilation  Hydration status: acceptable  Postoperative Nausea and Vomiting: none        No notable events documented.

## 2024-11-13 NOTE — ANESTHESIA PROCEDURE NOTES
Airway  Date/Time: 11/13/2024 7:45 AM  Urgency: elective    Airway not difficult    Staffing  Performed: ASIF   Authorized by: Mj Mondragon MD    Performed by: [facility]CO Corado  Patient location during procedure: OR    Indications and Patient Condition  Indications for airway management: anesthesia  Spontaneous Ventilation: absent  Sedation level: deep  Preoxygenated: yes  Patient position: sniffing  Mask difficulty assessment: 1 - vent by mask  No planned trial extubation    Final Airway Details  Final airway type: endotracheal airway      Successful airway: ETT  Cuffed: yes   Successful intubation technique: direct laryngoscopy  Facilitating devices/methods: intubating stylet  Blade: Kaleb  Blade size: #3  ETT size (mm): 7.0  Cormack-Lehane Classification: grade I - full view of glottis  Placement verified by: chest auscultation and capnometry   Cuff volume (mL): 7  Measured from: lips  ETT to lips (cm): 22  Number of attempts at approach: 1

## 2024-11-13 NOTE — PERIOPERATIVE NURSING NOTE
1010: Phase 1 care begins  1023: Pt family updated via message  1030: 5 oxycodone given per emr order  1045: Dr. Crouch bedside speaking to pt. No void order given per   1055: Catheter removed in PACU  1100: Phase 2 care begins

## 2024-11-13 NOTE — ANESTHESIA PREPROCEDURE EVALUATION
Patient: Verito Cross    Procedure Information       Date/Time: 11/13/24 6738    Procedure: Robot Assisted Laparoscopic Total Hysterectomy; Bilateral Salpingectomy; Cystoscopy    Location: [facility] A OR 08 / [facility] OR    Surgeons: Keaton Crouch MD            Relevant Problems   Anesthesia (within normal limits)      Cardiac   (+) Hyperlipidemia      Pulmonary (within normal limits)      Neuro  ADHD   (+) Anxiety and depression   (+) Chronic daily headache      GI (within normal limits)      /Renal (within normal limits)      Liver (within normal limits)      Endocrine   (+) Obesity      Musculoskeletal   (+) Osteoarthritis      GYN   (+) Menorrhagia with regular cycle       Clinical information reviewed:                   NPO Detail:  No data recorded     Physical Exam    Airway  Mallampati: I     Cardiovascular    Dental    Pulmonary    Abdominal            Anesthesia Plan    History of general anesthesia?: yes  History of complications of general anesthesia?: no    ASA 2     general     intravenous induction   Anesthetic plan and risks discussed with patient.

## 2024-11-13 NOTE — OP NOTE
Robot Assisted Laparoscopic Total Hysterectomy; Bilateral Salpingectomy; Cystoscopy Operative Note     Date: 2024  OR Location: [facility] A OR    Name: Verito Cross, : 1985, Age: 39 y.o., MRN: [id number], Sex: female    Diagnosis  Pre-op Diagnosis      * Menorrhagia with regular cycle [N92.0]     * Pelvic pain in female [R10.2] Post-op Diagnosis     * Menorrhagia with regular cycle [N92.0]     * Pelvic pain in female [R10.2]     Procedures  Robot Assisted Laparoscopic Total Hysterectomy; Bilateral Salpingectomy; Cystoscopy  84525 - NM LAPS TOTAL HYSTERECT 250 GM/< W/RMVL TUBE/OVARY      Surgeons      * Keaton Crouch - Primary    Resident/Fellow/Other Assistant:  Surgeons and Role:  * No surgeons found with a matching role *    Staff:   Surgical Assistant:   Circulator: Leela Jonub Person: Kusum Weems Person: Ivette  Circulator: Clare    Anesthesia Staff: Anesthesiologist: Mj Mondragon MD  CRNA: SANDY Carranza-CRNA  C-AA: FCO Corado  ASIF: Iman Walden    Procedure Summary  Anesthesia: General  ASA: II  Estimated Blood Loss: 50 mL  Intra-op Medications:   Administrations occurring from 0730 to 1000 on 24:   Medication Name Total Dose   BUPivacaine HCl (Marcaine) 0.5 % (5 mg/mL) injection 30 mL   sterile water irrigation solution 500 mL   fluorescein 500 mg/5 mL (10 %) injection 200 mg   ceFAZolin (Ancef) vial 1 g 2 g   dexAMETHasone (Decadron) injection 4 mg/mL 8 mg   fentaNYL (Sublimaze) injection 50 mcg/mL 100 mcg   HYDROmorphone (Dilaudid) injection 1 mg/mL 1 mg   ketorolac (Toradol) injection 30 mg 30 mg   lidocaine PF (Xylocaine-MPF) local injection 2 % 100 mg   midazolam PF (Versed) injection 1 mg/mL 2 mg   ondansetron (Zofran) 2 mg/mL injection 4 mg   propofol (Diprivan) injection 10 mg/mL 200 mg   rocuronium (ZeMuron) 50 mg/5 mL injection 90 mg   sodium chloride 0.9 % infusion 300 mL              Anesthesia Record               Intraprocedure I/O Totals           Output    Est. Blood Loss 25 mL    Total Output 25 mL          Specimen:   ID Type Source Tests Collected by Time   1 : UTERUS CERVIX BILATERAL TUBES Tissue UTERUS, CERVIX, AND BILATERAL FALLOPIAN TUBES SURGICAL PATHOLOGY EXAM Keaton Crouch MD 11/13/2024 0910                 Drains and/or Catheters:   Urethral Catheter Non-latex 16 Fr. (Active)       Tourniquet Times:         Implants:     Findings: Normal size uterus adhesions tween the bladder and the lower uterine segment    Indications: Verito Cross is an 39 y.o. female who is having surgery for Menorrhagia with regular cycle [N92.0]  Pelvic pain in female [R10.2].     The patient was seen in the preoperative area. The risks, benefits, complications, treatment options, non-operative alternatives, expected recovery and outcomes were discussed with the patient. The possibilities of reaction to medication, pulmonary aspiration, injury to surrounding structures, bleeding, recurrent infection, the need for additional procedures, failure to diagnose a condition, and creating a complication requiring transfusion or operation were discussed with the patient. The patient concurred with the proposed plan, giving informed consent.  The site of surgery was properly noted/marked if necessary per policy. The patient has been actively warmed in preoperative area. Preoperative antibiotics have been ordered and given within the incision hours of incision. Venous thrombosis prophylaxis are not indicated.    Procedure Details: Under satisfactory anesthesia patient was placed in a modified dorsolithotomy position.  A vaginal perineal abdominal prep was carried out patient was draped in usual manner.  Weighted speculum is placed in the anterior lip of cervix was grasped with single-tooth tenaculum.  If fornices manipulator was placed in position.  Bonilla catheter was inserted under continuous drainage.  A supraumbilical incision was made and a Veress needle was introduced  into the abdominal Without difficulty.  Laparoscopic trocar was placed and the laparoscope was placed in position.  Careful examination show upper abdomen unremarkable gallbladder was not visualized believed to be surgically absent appendix was not visualized readily examination of pelvis showed minimally enlarged uterus adnexa were unremarkable bladder was adherent along the  section  .  At this point 3 more trocars were placed in the usual pattern for a robotic case under direct visualization.  Bilateral tubes were then taken round ligaments were identified bilaterally taken down suspensory ligament of the ovary broad ligament were taken down along the cardinals bladder was retracted off the lower uterine segment with some difficulty due to previous scarring from her C-sections no perforations were noted.  Uterine arteries were then taken after being skeletonized posterior colpotomy was then made and brought around anteriorly in a circumferential fashion the uterus cervix were then removed en bloc vaginal cuff was closed using a 2-0 Vicryl V-Loc excellent hemostasis was noted irrigation was carried out blood and clots were removed cystoscopy was then performed using fluorescein no leakage was noted into the abdomen cystoscopy showed the bladder to be intact good Plume was noted from both ureteral orifices.  At this point Mary was placed at the vaginal cuff once again reinspection showed no bleeding gas let escape instruments were withdrawn fascia was closed with interrupted 2-0 Vicryl suture skin was closed with 6 subcuticular 4-0 Vicryl stitch and Dermabond estimated blood loss procedure was approximately 50 cc patient left recovery in good condition end of dictation  Complications:  None; patient tolerated the procedure well.    Disposition: PACU - hemodynamically stable.  Condition: stable         Task Performed by RNFA or Surgical Assistant:  Surgical assist closure    Attending Attestation: I  performed the procedure.    Keaton Crouch  Phone Number: [phone]

## 2024-11-14 ASSESSMENT — PAIN SCALES - GENERAL: PAINLEVEL_OUTOF10: 3

## 2024-11-19 LAB
LABORATORY COMMENT REPORT: NORMAL
PATH REPORT.FINAL DX SPEC: NORMAL
PATH REPORT.GROSS SPEC: NORMAL
PATH REPORT.RELEVANT HX SPEC: NORMAL
PATH REPORT.TOTAL CANCER: NORMAL

## 2024-11-25 ENCOUNTER — APPOINTMENT (OUTPATIENT)
Dept: OBSTETRICS AND GYNECOLOGY | Facility: CLINIC | Age: 39
End: 2024-11-25
Payer: COMMERCIAL

## 2024-11-25 VITALS
SYSTOLIC BLOOD PRESSURE: 137 MMHG | DIASTOLIC BLOOD PRESSURE: 83 MMHG | WEIGHT: 205 LBS | HEIGHT: 66 IN | BODY MASS INDEX: 32.95 KG/M2

## 2024-11-25 DIAGNOSIS — Z09 POSTOPERATIVE EXAMINATION: Primary | ICD-10-CM

## 2024-11-25 PROCEDURE — 1036F TOBACCO NON-USER: CPT | Performed by: OBSTETRICS & GYNECOLOGY

## 2024-11-25 PROCEDURE — 3008F BODY MASS INDEX DOCD: CPT | Performed by: OBSTETRICS & GYNECOLOGY

## 2024-11-25 PROCEDURE — 99024 POSTOP FOLLOW-UP VISIT: CPT | Performed by: OBSTETRICS & GYNECOLOGY

## 2024-11-25 RX ORDER — DEXTROAMPHETAMINE SACCHARATE, AMPHETAMINE ASPARTATE, DEXTROAMPHETAMINE SULFATE AND AMPHETAMINE SULFATE 5; 5; 5; 5 MG/1; MG/1; MG/1; MG/1
1 TABLET ORAL
COMMUNITY
Start: 2024-11-20

## 2024-11-25 ASSESSMENT — PAIN SCALES - GENERAL: PAINLEVEL_OUTOF10: 0-NO PAIN

## 2024-11-25 NOTE — PROGRESS NOTES
Subjective   Patient ID: Verito Cross is a 39 y.o. female who presents for Post-op Visit (2 week post op Robot Assisted Laparoscopic Total Hysterectomy; Bilateral Salpingectomy; Cystoscopy).  HPI  Patient here stent post robotically assisted laparoscopic hysterectomy bilateral salpingectomy.  Doing well.  Diet and exercise without difficulty minimal pain.  Patient notes she is urinating and having bowel movements without difficulty as well.  No nausea vomiting fever chills no GI or  complaints    Review of Systems  Denies any issues    Objective   Physical Exam  Vitals reviewed.   Constitutional:       Appearance: Normal appearance. She is obese.   Cardiovascular:      Rate and Rhythm: Normal rate and regular rhythm.   Pulmonary:      Effort: Pulmonary effort is normal.      Breath sounds: Normal breath sounds.   Abdominal:      General: Abdomen is flat. Bowel sounds are normal. There is no distension.      Palpations: Abdomen is soft.      Tenderness: There is no abdominal tenderness.      Comments: Incisions healing well.  Clean dry intact   Neurological:      General: No focal deficit present.      Mental Status: She is alert.   Psychiatric:         Mood and Affect: Mood normal.         Behavior: Behavior normal.         Thought Content: Thought content normal.         Judgment: Judgment normal.         Assessment/Plan   Diagnoses and all orders for this visit:  Postoperative examination  Doing well postop.  Return to office in 2 weeks for follow-up.  Instructions and precautions given.  Maintain pelvic rest     Keaton Crouch MD 11/25/24 8:56 AM

## 2024-12-10 ENCOUNTER — PATIENT MESSAGE (OUTPATIENT)
Dept: OBSTETRICS AND GYNECOLOGY | Facility: CLINIC | Age: 39
End: 2024-12-10
Payer: COMMERCIAL

## 2024-12-12 ENCOUNTER — TELEPHONE (OUTPATIENT)
Dept: OBSTETRICS AND GYNECOLOGY | Facility: CLINIC | Age: 39
End: 2024-12-12
Payer: COMMERCIAL

## 2024-12-12 NOTE — LETTER
December 12, 2024     Verito Cross    Patient: Verito Cross   YOB: 1985   Date of Visit: 12/12/2024       To whom it may concern:    Verito Cross is a Patient under my care that was recently seen for a post operative exam on 11/25/2024.      Patient was able to RTW 11/26/2024 to her normal schedule and activity as she works from home.    Ms. Cross will have a follow up post operative exam on 12/16/2024.    If you have questions, please do not hesitate to call our office.       Sincerely,     Keaton Crouch MD  OBGYN

## 2024-12-16 ENCOUNTER — APPOINTMENT (OUTPATIENT)
Dept: OBSTETRICS AND GYNECOLOGY | Facility: CLINIC | Age: 39
End: 2024-12-16
Payer: COMMERCIAL

## 2024-12-16 VITALS
WEIGHT: 207 LBS | HEIGHT: 66 IN | DIASTOLIC BLOOD PRESSURE: 76 MMHG | SYSTOLIC BLOOD PRESSURE: 122 MMHG | BODY MASS INDEX: 33.27 KG/M2

## 2024-12-16 DIAGNOSIS — Z09 POSTOPERATIVE EXAMINATION: Primary | ICD-10-CM

## 2024-12-16 PROCEDURE — 99024 POSTOP FOLLOW-UP VISIT: CPT | Performed by: OBSTETRICS & GYNECOLOGY

## 2024-12-16 PROCEDURE — 3008F BODY MASS INDEX DOCD: CPT | Performed by: OBSTETRICS & GYNECOLOGY

## 2024-12-16 ASSESSMENT — PAIN SCALES - GENERAL: PAINLEVEL_OUTOF10: 0-NO PAIN

## 2024-12-16 NOTE — PROGRESS NOTES
Subjective   Patient ID: Verito Cross is a 39 y.o. female who presents for Post-op Visit (Hysterectomy post op follow up  ).  HPI  Status post robotic assisted laparoscopic hysterectomy bilateral salpingectomy approximately 4 weeks ago.  Doing well.  Not really having any pain.  Diet and exercise okay urinating and bowel movements without difficulty.  Upper incision did spread apart a little bit.  But is nontender and clean    Review of Systems  Essentially negative minimal pain over incisions    Objective   Physical Exam  Vitals reviewed.   Constitutional:       Appearance: Normal appearance. She is obese.   Cardiovascular:      Rate and Rhythm: Normal rate and regular rhythm.   Pulmonary:      Effort: Pulmonary effort is normal.      Breath sounds: Normal breath sounds.   Abdominal:      General: Abdomen is flat. Bowel sounds are normal.      Palpations: Abdomen is soft.      Tenderness: There is no abdominal tenderness.      Comments: Incisions well-healed except for upper incision.  Did dehisce slightly but is clean dry and healing   Neurological:      Mental Status: She is alert.     Assessment/Plan   Diagnoses and all orders for this visit:  Postoperative examination  Return to office in 3 months.  Pelvic rest for 4 weeks       Keaton Crouch MD 12/16/24 11:17 AM

## 2025-03-10 ENCOUNTER — APPOINTMENT (OUTPATIENT)
Dept: OBSTETRICS AND GYNECOLOGY | Facility: CLINIC | Age: 40
End: 2025-03-10
Payer: COMMERCIAL

## 2025-04-02 ENCOUNTER — HOSPITAL ENCOUNTER (OUTPATIENT)
Dept: RADIOLOGY | Facility: HOSPITAL | Age: 40
Discharge: HOME | End: 2025-04-02
Payer: COMMERCIAL

## 2025-04-02 DIAGNOSIS — R92.8 OTHER ABNORMAL AND INCONCLUSIVE FINDINGS ON DIAGNOSTIC IMAGING OF BREAST: ICD-10-CM

## 2025-04-02 PROCEDURE — 77065 DX MAMMO INCL CAD UNI: CPT | Mod: RT

## 2025-04-02 PROCEDURE — 76982 USE 1ST TARGET LESION: CPT | Mod: RT

## 2025-04-02 PROCEDURE — 76642 ULTRASOUND BREAST LIMITED: CPT | Mod: RT

## 2025-04-04 ENCOUNTER — APPOINTMENT (OUTPATIENT)
Dept: RADIOLOGY | Facility: HOSPITAL | Age: 40
End: 2025-04-04
Payer: COMMERCIAL

## 2025-04-22 ENCOUNTER — APPOINTMENT (OUTPATIENT)
Dept: PRIMARY CARE | Facility: CLINIC | Age: 40
End: 2025-04-22
Payer: COMMERCIAL

## 2025-07-01 ASSESSMENT — PROMIS GLOBAL HEALTH SCALE
RATE_MENTAL_HEALTH: GOOD
CARRYOUT_SOCIAL_ACTIVITIES: VERY GOOD
RATE_GENERAL_HEALTH: VERY GOOD
RATE_QUALITY_OF_LIFE: VERY GOOD
RATE_AVERAGE_FATIGUE: MODERATE
CARRYOUT_PHYSICAL_ACTIVITIES: COMPLETELY
RATE_PHYSICAL_HEALTH: GOOD
EMOTIONAL_PROBLEMS: OFTEN
RATE_SOCIAL_SATISFACTION: GOOD
RATE_AVERAGE_PAIN: 3

## 2025-07-08 ENCOUNTER — APPOINTMENT (OUTPATIENT)
Dept: PRIMARY CARE | Facility: CLINIC | Age: 40
End: 2025-07-08
Payer: COMMERCIAL

## 2025-07-08 VITALS
WEIGHT: 179 LBS | HEIGHT: 66 IN | BODY MASS INDEX: 28.77 KG/M2 | SYSTOLIC BLOOD PRESSURE: 107 MMHG | OXYGEN SATURATION: 100 % | DIASTOLIC BLOOD PRESSURE: 73 MMHG | HEART RATE: 71 BPM

## 2025-07-08 DIAGNOSIS — Z13.220 LIPID SCREENING: ICD-10-CM

## 2025-07-08 DIAGNOSIS — F41.9 ANXIETY AND DEPRESSION: ICD-10-CM

## 2025-07-08 DIAGNOSIS — Z13.1 DIABETES MELLITUS SCREENING: ICD-10-CM

## 2025-07-08 DIAGNOSIS — Z12.11 ENCOUNTER FOR SCREENING FOR MALIGNANT NEOPLASM OF COLON: ICD-10-CM

## 2025-07-08 DIAGNOSIS — Z13.0 SCREENING FOR DEFICIENCY ANEMIA: Primary | ICD-10-CM

## 2025-07-08 DIAGNOSIS — E55.9 VITAMIN D DEFICIENCY: ICD-10-CM

## 2025-07-08 DIAGNOSIS — Z13.220 SCREENING FOR LIPOID DISORDERS: ICD-10-CM

## 2025-07-08 DIAGNOSIS — Z13.29 SCREENING FOR THYROID DISORDER: ICD-10-CM

## 2025-07-08 DIAGNOSIS — F32.A ANXIETY AND DEPRESSION: ICD-10-CM

## 2025-07-08 PROCEDURE — 1036F TOBACCO NON-USER: CPT

## 2025-07-08 PROCEDURE — 3008F BODY MASS INDEX DOCD: CPT

## 2025-07-08 PROCEDURE — 99396 PREV VISIT EST AGE 40-64: CPT

## 2025-07-08 RX ORDER — DULOXETIN HYDROCHLORIDE 60 MG/1
60 CAPSULE, DELAYED RELEASE ORAL DAILY
Qty: 90 CAPSULE | Refills: 3 | Status: SHIPPED | OUTPATIENT
Start: 2025-07-08

## 2025-07-08 ASSESSMENT — PATIENT HEALTH QUESTIONNAIRE - PHQ9
SUM OF ALL RESPONSES TO PHQ9 QUESTIONS 1 AND 2: 0
1. LITTLE INTEREST OR PLEASURE IN DOING THINGS: NOT AT ALL
2. FEELING DOWN, DEPRESSED OR HOPELESS: NOT AT ALL

## 2025-07-08 ASSESSMENT — ENCOUNTER SYMPTOMS
FATIGUE: 0
DIZZINESS: 1
VOMITING: 0
WEAKNESS: 0

## 2025-07-08 NOTE — PROGRESS NOTES
Subjective   Patient ID: Verito Cross is a 40 y.o. female who presents for Annual Exam (Patient is present in office for a CPE. Has been having issues with night sweats - intermittently x 1 year - worse the last 6 months and dizziness x 6-7 months- when standing up from sitting or laying down to standing. ), Dizziness, and Night Sweats.    Pt is here for annual wellness.  Reports overall health is good    Do you take any herbs or supplements that were not prescribed by a doctor? Yes vit k,   Are you taking calcium supplements? no  Are you taking aspirin daily? 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 yes    1. Patient Counseling:  --Nutrition: Stressed importance of moderation in sodium/caffeine intake, saturated fat and cholesterol, caloric balance, sufficient intake of fresh fruits, vegetables, fiber, calcium, iron, and 1 mg of folate supplement per day (for females capable of pregnancy).  --Discussed the issue of estrogen replacement, calcium supplement, and the daily use of baby aspirin as appropriate per pt.  --Exercise: Stressed the importance of regular exercise.   --Substance Abuse: Discussed cessation/primary prevention of tobacco, alcohol, or other drug use; driving or other dangerous activities under the influence; availability of treatment for abuse.    --Sexuality: Discussed sexually transmitted diseases, partner selection, use of condoms, avoidance of unintended pregnancy  and contraceptive alternatives.   --Injury prevention: Discussed safety belts, safety helmets, smoke detector, smoking near bedding or upholstery.   --Dental health: Discussed importance of regular tooth brushing, flossing, and dental  "visits.  --Immunizations reviewed.  --Discussed benefits of colon cancer screening.  --After hours service discussed with patient  2 Discussed the patient's BMI.  The BMI is in the acceptable range.  3 Follow up as needed for acute illness        Dizziness  This is a recurrent problem. The current episode started more than 1 month ago. The problem occurs intermittently. The problem has been waxing and waning. Pertinent negatives include no fatigue, vomiting or weakness.        Review of Systems   Constitutional:  Negative for fatigue.   Gastrointestinal:  Negative for vomiting.   Neurological:  Positive for dizziness. Negative for weakness.       Objective   /73   Pulse 71   Ht 1.676 m (5' 6\")   Wt 81.2 kg (179 lb)   LMP 09/21/2024 (Exact Date)   SpO2 100%   BMI 28.89 kg/m²     Physical Exam  Constitutional:       Appearance: Normal appearance.   HENT:      Head: Normocephalic and atraumatic.      Right Ear: Tympanic membrane and external ear normal.      Left Ear: Tympanic membrane and external ear normal.      Nose: Nose normal.      Mouth/Throat:      Mouth: Mucous membranes are moist.      Pharynx: Oropharynx is clear. Uvula midline.   Eyes:      General: Lids are normal.      Extraocular Movements: Extraocular movements intact.      Pupils: Pupils are equal, round, and reactive to light.   Neck:      Thyroid: No thyromegaly or thyroid tenderness.   Cardiovascular:      Rate and Rhythm: Normal rate and regular rhythm.      Heart sounds: Normal heart sounds.   Pulmonary:      Effort: Pulmonary effort is normal.      Breath sounds: Normal breath sounds.   Abdominal:      General: Bowel sounds are normal.      Palpations: Abdomen is soft.      Tenderness: There is no abdominal tenderness. There is no guarding.   Musculoskeletal:         General: No swelling. Normal range of motion.      Cervical back: Normal range of motion.      Right lower leg: No edema.      Left lower leg: No edema. "   Lymphadenopathy:      Head:      Right side of head: No submental, submandibular or tonsillar adenopathy.      Left side of head: No submental, submandibular or tonsillar adenopathy.      Cervical: No cervical adenopathy.   Skin:     General: Skin is warm and dry.      Capillary Refill: Capillary refill takes less than 2 seconds.      Coloration: Skin is not jaundiced.      Findings: No lesion or rash.   Neurological:      General: No focal deficit present.      Mental Status: She is alert and oriented to person, place, and time.   Psychiatric:         Mood and Affect: Mood normal.         Behavior: Behavior normal.         Thought Content: Thought content normal.         Judgment: Judgment normal.         Assessment/Plan   Verito was seen today for annual exam, dizziness and night sweats.  Diagnoses and all orders for this visit:  Screening for deficiency anemia  -     CBC; Future  -     CBC  Anxiety and depression  -     DULoxetine (Cymbalta) 60 mg DR capsule; Take 1 capsule (60 mg) by mouth once daily.  Diabetes mellitus screening  -     Comprehensive Metabolic Panel; Future  -     Comprehensive Metabolic Panel  Screening for thyroid disorder  -     TSH with reflex to Free T4 if abnormal; Future  -     TSH with reflex to Free T4 if abnormal  Lipid screening  -     Lipid Panel; Future  -     Lipid Panel  Screening for lipoid disorders  -     Lipid Panel; Future  -     Lipid Panel  Vitamin D deficiency  -     Vitamin D 25-Hydroxy,Total (for eval of Vitamin D levels); Future  -     Vitamin D 25-Hydroxy,Total (for eval of Vitamin D levels)  Encounter for screening for malignant neoplasm of colon  -     Colonoscopy Screening; High Risk Patient; had genetic testing positive for mutation, dad had colon CA; Future

## 2025-07-09 ENCOUNTER — OFFICE VISIT (OUTPATIENT)
Dept: ORTHOPEDIC SURGERY | Facility: CLINIC | Age: 40
End: 2025-07-09
Payer: COMMERCIAL

## 2025-07-09 ENCOUNTER — HOSPITAL ENCOUNTER (OUTPATIENT)
Dept: RADIOLOGY | Facility: CLINIC | Age: 40
Discharge: HOME | End: 2025-07-09
Payer: COMMERCIAL

## 2025-07-09 DIAGNOSIS — M25.511 RIGHT SHOULDER PAIN, UNSPECIFIED CHRONICITY: ICD-10-CM

## 2025-07-09 DIAGNOSIS — M75.41 ROTATOR CUFF IMPINGEMENT SYNDROME OF RIGHT SHOULDER: Primary | ICD-10-CM

## 2025-07-09 DIAGNOSIS — M25.511 ARTHRALGIA OF RIGHT ACROMIOCLAVICULAR JOINT: ICD-10-CM

## 2025-07-09 PROCEDURE — 99202 OFFICE O/P NEW SF 15 MIN: CPT

## 2025-07-09 PROCEDURE — 99204 OFFICE O/P NEW MOD 45 MIN: CPT | Performed by: FAMILY MEDICINE

## 2025-07-09 PROCEDURE — 1036F TOBACCO NON-USER: CPT | Performed by: FAMILY MEDICINE

## 2025-07-09 PROCEDURE — 73030 X-RAY EXAM OF SHOULDER: CPT | Mod: RIGHT SIDE | Performed by: RADIOLOGY

## 2025-07-09 PROCEDURE — 73030 X-RAY EXAM OF SHOULDER: CPT | Mod: RT

## 2025-07-09 RX ORDER — MELOXICAM 15 MG/1
15 TABLET ORAL DAILY
Qty: 14 TABLET | Refills: 1 | Status: SHIPPED | OUTPATIENT
Start: 2025-07-09

## 2025-07-09 NOTE — PROGRESS NOTES
** Please excuse any errors in grammar or translation related to this dictation. Voice recognition software was utilized to prepare this document. **    Assessment & Plan:  Examination findings most consistent with AC joint related pain and rotator cuff tendinitis.  No concern for acute full-thickness rotator cuff tendon tear.  Recommend initial management with 2-week course of meloxicam to reduce pain and inflammation.  Also recommending physical therapy to optimize functioning and support of the shoulder.  If symptoms are not improving over the next 4 to 6 weeks patient can follow-up for reassessment.  All questions answered and patient agrees to this plan of care.      Chief complaint:  Right shoulder pain    HPI:  41 y/o F presents with right shoulder pain.  This complaint has been ongoing for several months though acutely worsened over the past few days.  No reported injury over the last few months or in the past week to explain acute increase in pain.  Pain localized anterolateral shoulder.  Denies radicular symptoms or paresthesias.  Pain is most provoked reaching behind back such as to put on her bra.  Treatment today includes application of topical lidocaine.  Of note patient received subacromial steroid injection in 2023 which resolved her shoulder pain however she reports today's pain feels different than previously.  No previous shoulder surgery.    Problem List[1]  Medical History[2]  Surgical History[3]  Medications Ordered Prior to Encounter[4]    Exam:  General Exam:  Constitutional - NAD, AAO x 3, conversing appropriately.  HEENT- Normocephalic and atraumatic. No facial deformities. Hearing grossly normal.  Lungs - Breathing non-labored with normal rate. No accessory muscle use.  CV - Extremities warm and well-perfused, brisk capillary refill present.   Neuro - CN II-XII grossly intact.    Right shoulder Exam:  No swelling, warmth or ecchymosis of shoulder present.   AROM: Flexion 170deg; Abduction  160deg; IR to T12, ER to 60 deg  TTP over  AC joint  [5]/5 strength in ER, IR, abduction, flexion   SILT in all dermatomal distributions C5-T1  LABRUM: [+] O´aristides´s localizing pain superficially to AC joint, [-] Crank test, [-] Jerk test  INSTABILITY: [-] Apprehension  IMPINGEMENT:  [-] Hawkin´s, [-] Neer´s, [+] painful arc  ROTATOR CUFF: [-] Limb drop, [-] lag signs, +Empty Can (SS), [-] Belly press (SSc), [-] Hornblower (IS/TM)  BICEPS: [-] Speed´s  AC JOINT: [-] Scarf test      Results:  Xrays of right shoulder obtained 2025 personally interpreted as no acute fractures with well-preserved joint spacing.  Normal joint alignment.    Lab Results   Component Value Date    CREATININE 0.82 2024    EGFR >90 2024          [1]   Patient Active Problem List  Diagnosis    Adenomyosis    Adult attention deficit disorder    Anxiety and depression    Chest wall mass    Chronic daily headache    Hyperlipidemia    Vitamin D deficiency    Menorrhagia with regular cycle    Pelvic pain in female    Osteoarthritis    Obesity   [2]   Past Medical History:  Diagnosis Date    ADHD (attention deficit hyperactivity disorder) 22    Anxiety 05    Arthritis 2018    Depression 05    Other specified health status     No pertinent past medical history   [3]   Past Surgical History:  Procedure Laterality Date     SECTION, CLASSIC  2017     Section     SECTION, LOW TRANSVERSE  08, 13    CHOLECYSTECTOMY  2017    Cholecystectomy    HYSTERECTOMY  24    OTHER SURGICAL HISTORY  10/21/22    Right ankle tendon repair   [4]   Current Outpatient Medications on File Prior to Visit   Medication Sig Dispense Refill    amphetamine-dextroamphetamine (Adderall) 20 mg tablet Take 1 tablet (20 mg) by mouth every 12 hours.      cholecalciferol, vitD3,/vit K2 (vitamin D3-vitamin K2) 250 mcg (10,000 unit)-45 mcg capsule Take by mouth.      DULoxetine (Cymbalta) 60 mg DR capsule  [FreeTextEntry1] : Mr Weiss is a 83 year old man presented for follow up of benign prostatic hypertrophy with bladder outlet obstruction, kidney stones, and microscopic hematuria. The patient took tamsulosin in the past, but stopped due to light headiness. His urination remained satisfactory after discontinuing the medication. The patient's last PSA 04/24/17 was 0.69. The patient was in the ER 01/01/17 for abdominal pain. No etiology for the pain was identified and has since resolved. CT of the abdomen and pelvis in the ER found bilateral renal cysts and a 5 mm non obstructing right renal calculus. \par 1/17/18: CT scan was reviewed and discussed  in office which found no hydronephrosis or recurrent tumors . There are 4 cysts on the  left kidney and 2 cysts on the right.  There is a Stone in the right kidney in the upper portion. He reported earlier he was experiencing some left flank pain. At night he wakes up once to urinate. He remained on finasteride 5 mg once daily.\par 5/29/18: The patient returned and reported he wakes up once during the night to urinate. Currently taking Finasteride. Complained of intermittent left lumbar pain that remained the same since last visit. Daughter noted he does not drink enough water. \par 11/19/18: The patient returned today for a renal US. Renal US findings showed again there is a 5.5 mm echogenic focus with distal shadowing in the upper pole of the right kidney with bilateral simple non vascular cysts. There is a new septated cyst in the mid outer pole of the left kidney. Both kidneys appear normal in size and echogenicity. No hydronephrosis, or solid masses visualized. PSA from 3/29/18 was 0.71 ng/mL. Notes he was recently hospitalized for LE weakness and pain. Will be evaluated by a cardiologist for possible loop recorder placement. \par 5/22/19: Male patient presents today for a follow up. Blood work was completed 5/1/19 as per Dr. Ramirez. PSA was measured in January 2019  0.94 and creatinine as of May 2019 was 1.18 mg/dL. regarding urination he denies dysuria, gross hematuria, urgency or incontinence. He will wake up 2x a night to urinate. He has a Hx of high blood pressure and hyperlipidemia that are both well controlled on medication.\par \par 11/21/2019: Patient presents today for a follow up. Overall, he is doing well. He states his urination is good. Wakes up 1-2 times during the night to urinate. Patient denies dysuria, gross hematuria, urgency, or incontinence. Today, his only complaint is of some back pain. A urinalysis from  9/11/2019 was small positive for hematuria. His creatinine from 9/11/2019 was 1.29 mg/dL. He has not had a PSA measured since his last done January 2019 of 0.94 ng/mL. Small cyst/possible kidney stone in kidney found in the prior ultrasound of last year dated 11/19/2018. Digital rectal exam found no suspicious rectal masses. Anal tone is normal. The prostate is non tender, with normal texture, discrete borders, and no nodules. It is a 30 gram transurethral resection size. No gross blood on the examining finger. A little external hemorrhoid at 1 o'clock position was found though not inflamed. Due the presence of a small cyst/possible kidney stone found in the ultrasound report from last year dated 11/19/2018, a renal ultrasound has been ordered today and will be obtained by the office. Finding: Again there is a 5.5 mm echogenic focus with distal shadowing in the upper pole of the right kidney with bilateral simple non vascular cysts. There is a new septated cyst in the mid outer pole of the left kidney. Both kidneys appear normal in size and echogenicity. No hydronephrosis, or solid masses visualized. In my opinion, it looks more like a Jeremie's Plaque than a kidney stone.\par \par 11/23/2020: Patient presents today for follow up. Takes Finasteride 5mg once daily. Urination is good. Wakes 1-2x at night to urinate. Denies urinary urgency, pushing or straining, dysuria, gross hematuria. No constipation or chest pain. Had blood work by PCP on 9/23/20 showing creatinine of 1.20 and HbA1c of 5.8. Offers no new complaints today. Has appointment with PCP Dr. Ramirez tomorrow. \par \par 05/24/2021: Patient presents today for follow up. Wakes 1-2x at night to urinate. Denies dysuria, gross hematuria, urinary urgency, pushing or straining. Continues to take Finasteride. Has pain in lower back radiating down to leg and follows Dr. Ramirez for this. Hx of kidney stones. Traveling to [facility] for two months next month. \par \par Renewed Finasteride. \par Patient will schedule for a renal US for hx of kidney stones\par Blood work today included BMP, alkaline phosphatase, PSA, and testosterone. \par The patient produced a urine sample which will be sent for urinalysis, urine cytology, and urine culture. \par Patient will schedule a telehealth visit to review renal US results. \par \par 10/28/2021: Patient presents today for a follow up. He is accompanied by his son, Venu, who is translating for him. Pt reports nocturia 2-3x a night which is not bothersome. Denies urinary urgency, pushing, straining, gross hematuria, and burning. Has some back pain that is helped by injections. Pt is no longer sexually active according to his son who is translating. Pt obtained a renal US about 4 months ago on 5/27/2021 at [facility] which demonstrated no evidence of hydronephrosis. Bilateral renal cysts measuring up to 5.4 cm in the left kidney, previously measuring 4.9 cm. Enlarged prostate. PVR was 82.9. Pt's son believes his memory slightly diminishing but nothing concerning. Pt last saw  on 9/23/2021. Blood work of that visit revealed a creatinine of 1.24, eGFR was 54, PSA was 0.6 and platelets were slightly low. He denies having any problems clotting if he is bleeding. \par \par 04/28/2022: Patient presents today for a follow up visit. He was accompanied by his wife who helped to translate for him. Hx of kidney stones. Denies nocturia, urgency, gross hematuria, urge incontinence, or pushing/straining. He urinates a few times during the day. His last renal US was in 5/2021 and he did not have any kidney stones at that time. His US showed bilateral renal cysts measuring up to 5.4 cm in the left kidney, 4.9 cm previously. Pt currently takes Finasteride 5 mg, one tablet daily. \par Blood work today included BMP, A1C, androstenedione, CBC, alkaline phosphatase, PSA, and testosterone.\par The patient produced a urine sample which will be sent for urinalysis, urine cytology, and urine culture. \par I recommend the patient increase his fluid intake to prevent the formation of kidney stones. \par RTO in 6 months. \par \par 11/08/2022: The patient gave permission for an audio and visual telehealth conference. We utilized [employer] teams telemetry doc platform. The patient was located in his home in [place], [place]. I was located in my office in [place], [place]. I spoke with his son. We reviewed his recent labs, which indicate a Creatinine of 1.28 on October 7, 2022. A PSA was done on April 28 of this year, which was 0.61 ng/mL. This is very low for his age. His HbA1c is 6.0%. The son denies gross hematuria, dysuria, and straining to urinate. He is continuing on the finasteride 5mg with no gynecomastia or mastodynia. He denies constipation. \par \par He is no longer sexually active and is not concerned with his erectile function. \par We agreed that he would follow up in the springtime for a repeat PSA. Additionally, I have renewed his finasteride.\par At his next appointment, I would like to examine him and offer a digital rectal exam.  \par \par 06/01/2023: Mr. ARABELLA WEISS presents today for a follow up. He is accompanied by his wife. A Persian  was utilized during today's visit. He reports that his urination is fairly good. Nocturia x2-3. During the daytime, he voids about every 2-3 hours. His wife reports he does not drink much water. When asked he reports he drinks 5-6 glasses of liquids a day with one of them being water. Drinks a half cup of coffee as well as a lot of tea. The pt is no longer sexually active. Denies any pain from kidney stones. Continues to take finasteride 5 mg once daily. Denies pushing and straining. Continues to see Dr.Antonios Ramirez. \par \par The knee-chest position was utilized for the TREMAINE. Digital rectal exam found no suspicious rectal masses. Normal seminal vesicles. Anal tone is normal. The prostate is non tender, with normal texture, and no nodules. Borders are slightly indistinct. It is a 50 gram transurethral resection size. No rectal mucosal lesions. No gross blood on the examining finger. \par The patient produced a urine sample which will be sent for urinalysis, urine cytology, and urine culture. \par Blood work today included calcium, CBC with diff, CMP, serum osmolality, PSA, renal panel, testosterone, and uric acid. \par Renewed finasteride 5 mg once daily. Additionally, I offered to prescribe him an additional medication to reduce his nocturia further, however he declined. \par Pt was strongly advised to increase his water intake. \par Patient will RTO in 3 months for renal US. \par \par 06/08/2023: Mr. ARABELLA WEISS presents today for a follow up audio only telephone visit for which he gave permission for. The pt was located at home,  [address] [place] [address], and I was located in my office in [place], [place]. He is accompanied by his son. The pt had a renal US yesterday which demonstrated there are two simple cysts in the lower pole of the right kidney measuring 4.87 cm x 3.44 cm x 3.26 cm and 3.72 cm x 3.48 cm x 3.58 cm. Both kidneys are normal in size and echogenicity without stones, hydronephrosis or solid masses visualized. The pt had lab work done on 6/1/2023. PSA was very good at 0.57. Testosterone was normal at 453. Renal panel  revealed elevated creatinine of 1.48, which is why the renal US was ordered. Pt is traveling to [facility] next week for 3 months. He had to delay his trip due to getting covid and developing pneumonia, however his son states he is doing much better. The pt continues to take finasteride 5 mg once daily but requests a medication to further improve urination like we discussed at the last visit. \par \par Clinical findings and US results were reviewed at length with the patient's son. I personally reviewed the imaging myself. \par  \par Reviewed and discussed laboratory work of 6/1/2023 which he obtained prior to today's visit as requested. \par \par I prescribed the patient Tamsulosin 0.4 mg once daily after a meal. I advised the patient the side of effects of nasal congestion and light-headedness. I advised him to use saline nasal spray if he gets congestion but to avoid decongestion medication as this will worsen his urination. I also informed the patient that if he is sexually active, there will be a decrease in semen volume while taking this medication but this is not harmful. \par  \par Patient will have a telehealth visit in 3-4 weeks for reassessment on tamsulosin. I informed the pt's son that since the pt will be away I can call his son to hear about how his father is doing on it. \par \par Duration of telephone visit: 15 mins Take 1 capsule (60 mg) by mouth once daily. 90 capsule 3    [DISCONTINUED] acetaminophen (Tylenol 8 HOUR) 650 mg ER tablet Take 1 tablet (650 mg) by mouth every 8 hours if needed for mild pain (1 - 3). Do not crush, chew, or split.      [DISCONTINUED] DULoxetine (Cymbalta) 60 mg DR capsule TAKE 1 CAPSULE BY MOUTH ONCE DAILY. 90 capsule 3    [DISCONTINUED] ibuprofen 800 mg tablet Take 1 tablet (800 mg) by mouth 3 times a day as needed for mild pain (1 - 3). 90 tablet 0    [DISCONTINUED] oxyCODONE (Roxicodone) 5 mg immediate release tablet Take 1 tablet (5 mg) by mouth every 6 hours if needed for severe pain (7 - 10). (Patient not taking: Reported on 12/16/2024) 15 tablet 0     No current facility-administered medications on file prior to visit.

## 2025-07-12 LAB
25(OH)D3+25(OH)D2 SERPL-MCNC: 109 NG/ML (ref 30–100)
ALBUMIN SERPL-MCNC: 4.4 G/DL (ref 3.6–5.1)
ALP SERPL-CCNC: 69 U/L (ref 31–125)
ALT SERPL-CCNC: 11 U/L (ref 6–29)
ANION GAP SERPL CALCULATED.4IONS-SCNC: 8 MMOL/L (CALC) (ref 7–17)
AST SERPL-CCNC: 14 U/L (ref 10–30)
BILIRUB SERPL-MCNC: 0.8 MG/DL (ref 0.2–1.2)
BUN SERPL-MCNC: 16 MG/DL (ref 7–25)
CALCIUM SERPL-MCNC: 8.7 MG/DL (ref 8.6–10.2)
CHLORIDE SERPL-SCNC: 103 MMOL/L (ref 98–110)
CHOLEST SERPL-MCNC: 153 MG/DL
CHOLEST/HDLC SERPL: 2.7 (CALC)
CO2 SERPL-SCNC: 25 MMOL/L (ref 20–32)
CREAT SERPL-MCNC: 0.86 MG/DL (ref 0.5–0.99)
EGFRCR SERPLBLD CKD-EPI 2021: 88 ML/MIN/1.73M2
ERYTHROCYTE [DISTWIDTH] IN BLOOD BY AUTOMATED COUNT: 13.5 % (ref 11–15)
GLUCOSE SERPL-MCNC: 92 MG/DL (ref 65–99)
HCT VFR BLD AUTO: 41.3 % (ref 35–45)
HDLC SERPL-MCNC: 56 MG/DL
HGB BLD-MCNC: 13.1 G/DL (ref 11.7–15.5)
LDLC SERPL CALC-MCNC: 81 MG/DL (CALC)
MCH RBC QN AUTO: 29 PG (ref 27–33)
MCHC RBC AUTO-ENTMCNC: 31.7 G/DL (ref 32–36)
MCV RBC AUTO: 91.4 FL (ref 80–100)
NONHDLC SERPL-MCNC: 97 MG/DL (CALC)
PLATELET # BLD AUTO: 333 THOUSAND/UL (ref 140–400)
PMV BLD REES-ECKER: 9.2 FL (ref 7.5–12.5)
POTASSIUM SERPL-SCNC: 4.3 MMOL/L (ref 3.5–5.3)
PROT SERPL-MCNC: 7 G/DL (ref 6.1–8.1)
RBC # BLD AUTO: 4.52 MILLION/UL (ref 3.8–5.1)
SODIUM SERPL-SCNC: 136 MMOL/L (ref 135–146)
TRIGL SERPL-MCNC: 81 MG/DL
TSH SERPL-ACNC: 1.43 MIU/L
WBC # BLD AUTO: 5.9 THOUSAND/UL (ref 3.8–10.8)

## 2025-07-15 ENCOUNTER — APPOINTMENT (OUTPATIENT)
Dept: ORTHOPEDIC SURGERY | Facility: CLINIC | Age: 40
End: 2025-07-15
Payer: COMMERCIAL

## 2025-08-25 ENCOUNTER — APPOINTMENT (OUTPATIENT)
Dept: ORTHOPEDIC SURGERY | Facility: CLINIC | Age: 40
End: 2025-08-25
Payer: COMMERCIAL

## 2025-08-25 DIAGNOSIS — M25.562 LEFT KNEE PAIN, UNSPECIFIED CHRONICITY: ICD-10-CM

## 2025-09-02 ENCOUNTER — OFFICE VISIT (OUTPATIENT)
Dept: ORTHOPEDIC SURGERY | Facility: CLINIC | Age: 40
End: 2025-09-02
Payer: COMMERCIAL

## 2025-09-02 ENCOUNTER — HOSPITAL ENCOUNTER (OUTPATIENT)
Dept: RADIOLOGY | Facility: CLINIC | Age: 40
Discharge: HOME | End: 2025-09-02
Payer: COMMERCIAL

## 2025-09-02 DIAGNOSIS — M25.462 EFFUSION OF LEFT KNEE: ICD-10-CM

## 2025-09-02 DIAGNOSIS — M25.562 LEFT KNEE PAIN, UNSPECIFIED CHRONICITY: ICD-10-CM

## 2025-09-02 PROCEDURE — 99203 OFFICE O/P NEW LOW 30 MIN: CPT | Performed by: STUDENT IN AN ORGANIZED HEALTH CARE EDUCATION/TRAINING PROGRAM

## 2025-09-02 PROCEDURE — 73564 X-RAY EXAM KNEE 4 OR MORE: CPT | Mod: LEFT SIDE | Performed by: RADIOLOGY

## 2025-09-02 PROCEDURE — 99212 OFFICE O/P EST SF 10 MIN: CPT

## 2025-09-02 PROCEDURE — 73564 X-RAY EXAM KNEE 4 OR MORE: CPT | Mod: LT

## 2025-09-12 ENCOUNTER — APPOINTMENT (OUTPATIENT)
Dept: RADIOLOGY | Facility: CLINIC | Age: 40
End: 2025-09-12
Payer: COMMERCIAL

## 2025-09-16 ENCOUNTER — APPOINTMENT (OUTPATIENT)
Dept: ORTHOPEDIC SURGERY | Facility: CLINIC | Age: 40
End: 2025-09-16
Payer: COMMERCIAL

## 2025-09-22 ENCOUNTER — APPOINTMENT (OUTPATIENT)
Dept: ORTHOPEDIC SURGERY | Facility: CLINIC | Age: 40
End: 2025-09-22
Payer: COMMERCIAL

## 2026-07-09 ENCOUNTER — APPOINTMENT (OUTPATIENT)
Dept: PRIMARY CARE | Facility: CLINIC | Age: 41
End: 2026-07-09
Payer: COMMERCIAL

## (undated) DEVICE — TUBE SET, PNEUMOLAR HEATED, SMOKE EVACU, HIGH-FLOW

## (undated) DEVICE — SUTURE, MONOCRYL, 4-0, 18 IN, PS2, UNDYED

## (undated) DEVICE — SOLUTION, IRRIGATION, SODIUM CHLORIDE 0.9%, 1000 ML, POUR BOTTLE

## (undated) DEVICE — ADHESIVE, SKIN, DERMABOND ADVANCED, 15CM, PEN-STYLE

## (undated) DEVICE — SCISSORS, MONOPOLAR, CURVED, 8MM

## (undated) DEVICE — IRRIGATION SET, CYSTOSCOPY, TURP, Y, CONTINUOUS, 81 IN

## (undated) DEVICE — GLOVE, SURGICAL, PROTEXIS PI MICRO, 7.5, PF, LF

## (undated) DEVICE — SEALER, VESSEL, EXTENDED

## (undated) DEVICE — BRIEF, CURITY, XLARGE, MESH

## (undated) DEVICE — GRASPER, COBRA, DAVINCI XI

## (undated) DEVICE — GOWN, SURGICAL, SMARTGOWN, XLARGE, STERILE

## (undated) DEVICE — PAD, SANITARY, OBSTETRICAL, W/ADHSV STRIP,11 IN,LF

## (undated) DEVICE — SYRINGE, LUER LOCK, 12ML

## (undated) DEVICE — Device

## (undated) DEVICE — ACCESS SYS, KII SHIELDED BLADED, Z-THREAD, 5X100CM

## (undated) DEVICE — SUTURE, VICRYL, 0, 27 IN, UR-6, VIOLET

## (undated) DEVICE — SOLUTION, INJECTION, USP, SODIUM CHLORIDE 0.9%, .9, NACL, 1000 ML, BAG

## (undated) DEVICE — DRIVER, MEGA NEEDLE

## (undated) DEVICE — POSITIONING KIT, PAGAZZI, PINK PAD XL, W/ ARM AND HEAD REST

## (undated) DEVICE — DRAPE, COLUMN, DAVINCI XI

## (undated) DEVICE — SEAL, UNIVERSAL, 5-12MM

## (undated) DEVICE — COVER, TIP HOT SHEARS ENDOWRIST

## (undated) DEVICE — OBTURATOR, BLADELESS , SU

## (undated) DEVICE — DRAPE, ARM XI

## (undated) DEVICE — HEMOSTAT, SURGICEL POWDER 3.0GRAMS

## (undated) DEVICE — TRAY, SURESTEP, URINE METER, 16FR, COMPLETE, W/STATLOCK

## (undated) DEVICE — SHEAR, W/UNIPOLAR CAUTERY, ENDOSHEAR, 5 MM

## (undated) DEVICE — APPLICATOR, ENDOSCOPIC, F/SURGICEL POWDER

## (undated) DEVICE — SOLUTION, SCRUB EXIDINE, 4% CHG, 4 OZ

## (undated) DEVICE — PUMP, STRYKERFLOW 2 & HANDPIECE W/10FT. IRRIGATION TUBING

## (undated) DEVICE — DEVICE, FS-35

## (undated) DEVICE — IRRIGATION SET, CYSTOSCOPY, REGULATING CLAMP, STRAIGHT, 81 IN

## (undated) DEVICE — NEEDLE, INSUFFLATION, 13GAX120MM, DISP

## (undated) DEVICE — CARE KIT, LAPAROSCOPIC, ADVANCED

## (undated) DEVICE — SUTURE, V-LOC, 2-0, 12IN, GS-21, GR 180 ABS